# Patient Record
Sex: FEMALE | Race: WHITE | Employment: FULL TIME | URBAN - METROPOLITAN AREA
[De-identification: names, ages, dates, MRNs, and addresses within clinical notes are randomized per-mention and may not be internally consistent; named-entity substitution may affect disease eponyms.]

---

## 2024-04-03 ENCOUNTER — HOSPITAL ENCOUNTER (OUTPATIENT)
Dept: PHYSICAL THERAPY | Age: 54
Setting detail: RECURRING SERIES
End: 2024-04-03
Attending: STUDENT IN AN ORGANIZED HEALTH CARE EDUCATION/TRAINING PROGRAM
Payer: COMMERCIAL

## 2024-04-03 NOTE — PROGRESS NOTES
Sandra Wu  : 1970  Primary: [facility]  Secondary:  [facility] @ [address]  [facility] [address]  Phone: [phone]  Fax: [phone]    PT Visit Info:    Total # of Visits to Date: 1  Progress Note Counter: 1  Progress Note Due Date: 24  Canceled Appointment: 1     OT Visit Info:  No data recorded    OUTPATIENT THERAPY: 4/3/2024  Episode  Appt Desk        Sandra Wu cancelled her appointment for today due to   in the hospital .  Will plan to follow up next during next appointment.  Thank you,  DEVORAH KNIGHT PTA    Future Appointments   Date Time Provider Department Center   4/3/2024  7:00 PM Devorah Knight, PTA [facility]   2024 10:15 AM Vadim Che, PT [facility]   4/10/2024 10:15 AM Vadim Che, PT [facility]   4/15/2024 10:15 AM Vadim Che, PT [facility]   2024 10:15 AM Vadim Che, PT [facility]   2024 10:15 AM Laura Pham MD [facility] AMB   2024 10:15 AM Vadim Che, PT [facility]   2024 10:15 AM Vadim Che, PT [facility]   2024 10:15 AM Devorah Knight, PTA [facility] S[facility]   2024 10:15 AM Devorah Knight, PTA [facility] S[facility]   2024  9:10 AM Candis Burns MD [facility] G[facility] AMB   2024  8:30 AM Lora Horowitz, PAJuan ManuelC PO GVL AMB

## 2024-04-08 ENCOUNTER — HOSPITAL ENCOUNTER (OUTPATIENT)
Dept: PHYSICAL THERAPY | Age: 54
Setting detail: RECURRING SERIES
End: 2024-04-08
Attending: STUDENT IN AN ORGANIZED HEALTH CARE EDUCATION/TRAINING PROGRAM
Payer: COMMERCIAL

## 2024-04-08 NOTE — PROGRESS NOTES
Sandra Wu  : 1970  Primary: [facility]  Secondary:  [facility] @ [address]  [facility] [address]  Phone: [phone]  Fax: [phone]    PT Visit Info:    Total # of Visits to Date: 1  Progress Note Counter: 1  Progress Note Due Date: 24  Canceled Appointment: 1     OT Visit Info:  No data recorded    OUTPATIENT THERAPY: 2024  Episode  Appt Desk        Sandra Wu cancelled her appointment for today due to work related issues.  Will plan to follow up next during next appointment.  Thank you,  Vadim Che, PT    Future Appointments   Date Time Provider Department Center   4/10/2024 10:15 AM Vadim Che, PT [facility]   4/15/2024 10:15 AM Vadim Che, PT [facility]   2024 10:15 AM Vadim Che, PT [facility]   2024 10:15 AM Laura Pham MD [facility] G[facility] AMB   2024 10:15 AM Vadim Che, PT [facility] S[facility]   2024 10:15 AM Vadim Che, PT [facility] S[facility]   2024 10:15 AM Devorah Cabral, PTA [facility] S[facility]   2024 10:15 AM Devorah Cabral, PTA [facility] SFE   2024  9:10 AM Candis Burns MD [facility] GVL AMB   2024  8:30 AM Lora Horowitz, PAJuan ManuelC PO GVL AMB

## 2024-04-22 ENCOUNTER — HOSPITAL ENCOUNTER (OUTPATIENT)
Dept: PHYSICAL THERAPY | Age: 54
Setting detail: RECURRING SERIES
End: 2024-04-22
Attending: STUDENT IN AN ORGANIZED HEALTH CARE EDUCATION/TRAINING PROGRAM
Payer: COMMERCIAL

## 2024-04-22 NOTE — PROGRESS NOTES
Sandra Wu  : 1970  Primary: [facility]  Secondary:  [facility] @ [address]  [facility] [address]  Phone: [phone]  Fax: [phone]    PT Visit Info:    Total # of Visits to Date: 3  Progress Note Counter: 3  Progress Note Due Date: 24  Canceled Appointment: 1     OT Visit Info:  No data recorded    OUTPATIENT THERAPY: 2024  Episode  Appt Desk        Sandra Wu cancelled her appointment for today due to   has an appt .  Will plan to follow up next during next appointment.  Thank you,  Vadim Che, PT    Future Appointments   Date Time Provider Department Center   2024  7:00 PM Vadim Che, PT [facility]   2024 10:15 AM Vadim Che, PT [facility] S[facility]   2024 10:15 AM Devorah Cabral, PTA [facility]PT SFE   2024 10:15 AM Devorah Cabral, PTA S[facility]ORPT SFE   2024 11:00 AM Laura Pham MD [facility] GVL AMB   2024  9:10 AM Candis Burns MD [facility] GVL AMB   2024  9:20 AM Lora Horowitz, PAJuan ManuelC [facility] GVL AMB

## 2024-04-29 ENCOUNTER — HOSPITAL ENCOUNTER (OUTPATIENT)
Dept: PHYSICAL THERAPY | Age: 54
Setting detail: RECURRING SERIES
Discharge: HOME | End: 2024-05-02
Attending: STUDENT IN AN ORGANIZED HEALTH CARE EDUCATION/TRAINING PROGRAM
Payer: COMMERCIAL

## 2024-04-29 PROCEDURE — 97110 THERAPEUTIC EXERCISES: CPT

## 2024-04-29 PROCEDURE — 97140 MANUAL THERAPY 1/> REGIONS: CPT

## 2024-04-29 ASSESSMENT — PAIN SCALES - GENERAL: PAINLEVEL_OUTOF10: 8

## 2024-04-29 NOTE — PROGRESS NOTES
Sandra Wu  : 1970  Primary: [facility] (Med[facility] Managed)  Secondary:  [facility] @ [address]  [facility] [address]  Phone: [phone]  Fax: [phone] Plan Frequency: 2x/wk for 90 days    Plan of Care/Certification Expiration Date: 24        Plan of Care/Certification Expiration Date:  Plan of Care/Certification Expiration Date: 24    Frequency/Duration: Plan Frequency: 2x/wk for 90 days      Time In/Out:   Time In: 1025  Time Out: 1105      PT Visit Info:    Progress Note Due Date: 24  Total # of Visits to Date: 4  Progress Note Counter: 4  Canceled Appointment: 1      Visit Count:  4    OUTPATIENT PHYSICAL THERAPY:   Treatment Note 2024       Episode  (PT: LBP, R Hip Pain, Bilateral Knee Pain)               Treatment Diagnosis:    Other low back pain  Pain in right hip  Right knee pain, unspecified chronicity  Left knee pain, unspecified chronicity  Medical/Referring Diagnosis:    Pain in both knees, unspecified chronicity  Chronic right-sided low back pain with right-sided sciatica  Right hip pain  Referring Physician:  Laura Pham MD MD Orders:  PT Eval and Treat   Return MD Appt:  24   Date of Onset:  LBP approximately 3 years duration; B knee pain approximately 2 months duration   Allergies:   Amoxicillin, Ibuprofen-diphenhydramine cit, and Sulfa antibiotics  Restrictions/Precautions:   None      Interventions Planned (Treatment may consist of any combination of the following):     See Assessment Note    Subjective Comments:   \"It is going down my leg, starting at my tailbone - this is new, none of the medication is touching it\"  \" was pulling weeds, but I sit down and do it\"  \"My neck is stiff too\" \"I have follow up with my doctors  and \"  \"I really don't do my exercises at home, I have a lot going on\"  Initial Pain Level::     8/10 back/hip going down right knee  Post Session Pain Level:

## 2024-05-01 ENCOUNTER — HOSPITAL ENCOUNTER (OUTPATIENT)
Dept: PHYSICAL THERAPY | Age: 54
Setting detail: RECURRING SERIES
End: 2024-05-01
Attending: STUDENT IN AN ORGANIZED HEALTH CARE EDUCATION/TRAINING PROGRAM
Payer: COMMERCIAL

## 2024-05-01 NOTE — PROGRESS NOTES
Name: Sandra Lopez  YOB: 1970  Gender: female  MRN: [id number]  Date of Encounter:  5/2/2024       CHIEF COMPLAINT:     Chief Complaint   Patient presents with    Injections     Bilateral Knee (Euflexxa #1)        SUBJECTIVE/OBJECTIVE:      HPI:    Patient is a 53 y.o. pleasant female who presents today for a Euflexxa injection of the bilateral knee, #1    Recall Hx:  Ms. Wu comes in for pain in both knees but it is more severe on the right. She has right hip pain radiating down her leg to her ankle. Pain in the hip is both posterior and lateral. Knee pain is more lateral. She has swelling of the knee and grinding. Pain is throbbing and burning. She denies numbness. She has been taking mobic and robaxin. She has a hx of right PTTD with subtalar arthritis and was previously in a boot and did physical therapy for the foot and ankle in '22.      3/7/24: Recommended bilateral CSI, which she elected to proceed with. Also advised formal therapy and an evaluation with spine team.  4/18/24: She had acute pain following injection, then some relief that day, but no lasting pain relief with CSI. She has gone to 2 therapy visits and notes soreness, but she also feels she is weak with exercises and seems encouraged that this will help. She has seen Lora Horowitz about her back, who also recommended PT.     1. Primary osteoarthritis of left knee    2. Primary osteoarthritis of right knee        Bilateral Knee Injection - US guided      An injection of Euflexxa viscosupplement was discussed today and is indicated for patient’s pain and condition today. Risks including infection, bleeding, nerve damage, and pain at the site of injection were discussed at length with the patient. Benefits including pain relief were also discussed with the patient. Patient verbalizes understanding of these and agrees to procedure. she consents to this procedure.Time-out was conducted with all members of the care team.     The

## 2024-05-01 NOTE — PROGRESS NOTES
Sandra Wu  : 1970  Primary: [facility]  Secondary:  [facility] @ [address]  [facility] [address]  Phone: [phone]  Fax: [phone]    PT Visit Info:    Total # of Visits to Date: 4  Progress Note Counter: 4  Progress Note Due Date: 24  No Show: 1  Canceled Appointment: 4     OT Visit Info:  No data recorded    OUTPATIENT THERAPY: 2024  Episode  Appt Desk        Sandra Wu cancelled her  4th cancellation at this time with 1 NO SHOW  for today due to  taking  back home for hospital .  Will plan to follow up next during next appointment.  Thank you,  KELECHI KNIGHT [facility]    Future Appointments   Date Time Provider Department Center   2024 11:00 AM Laura Pham MD [facility] GVL AMB   2024  9:10 AM Candis Burns MD [facility] GVL AMB   2024  9:20 AM Lora Horowitz PAJuan ManuelC [facility] GVL AMB   2024  1:00 PM Mikal Fuentes MD PM GVL AMB

## 2024-05-02 ENCOUNTER — OFFICE VISIT (OUTPATIENT)
Dept: ORTHOPEDIC SURGERY | Age: 54
End: 2024-05-02

## 2024-05-02 DIAGNOSIS — M17.12 PRIMARY OSTEOARTHRITIS OF LEFT KNEE: Primary | ICD-10-CM

## 2024-05-02 DIAGNOSIS — M17.11 PRIMARY OSTEOARTHRITIS OF RIGHT KNEE: ICD-10-CM

## 2024-05-02 RX ORDER — HYALURONATE SODIUM 10 MG/ML
20 SYRINGE (ML) INTRAARTICULAR ONCE
Status: COMPLETED | OUTPATIENT
Start: 2024-05-02 | End: 2024-05-02

## 2024-05-02 RX ADMIN — Medication 20 MG: at 11:14

## 2024-05-02 RX ADMIN — Medication 20 MG: at 11:15

## 2024-05-03 RX ORDER — HYDROCHLOROTHIAZIDE 12.5 MG/1
12.5 TABLET ORAL DAILY
COMMUNITY
Start: 2024-04-06 | End: 2024-05-06

## 2024-05-03 SDOH — HEALTH STABILITY: PHYSICAL HEALTH: ON AVERAGE, HOW MANY MINUTES DO YOU ENGAGE IN EXERCISE AT THIS LEVEL?: 120 MIN

## 2024-05-03 SDOH — HEALTH STABILITY: PHYSICAL HEALTH: ON AVERAGE, HOW MANY DAYS PER WEEK DO YOU ENGAGE IN MODERATE TO STRENUOUS EXERCISE (LIKE A BRISK WALK)?: 5 DAYS

## 2024-05-06 ENCOUNTER — HOSPITAL ENCOUNTER (OUTPATIENT)
Dept: PHYSICAL THERAPY | Age: 54
Setting detail: RECURRING SERIES
Discharge: HOME | End: 2024-05-09
Attending: STUDENT IN AN ORGANIZED HEALTH CARE EDUCATION/TRAINING PROGRAM
Payer: COMMERCIAL

## 2024-05-06 ENCOUNTER — OFFICE VISIT (OUTPATIENT)
Dept: PRIMARY CARE CLINIC | Facility: CLINIC | Age: 54
End: 2024-05-06
Payer: COMMERCIAL

## 2024-05-06 VITALS
HEIGHT: 65 IN | OXYGEN SATURATION: 97 % | HEART RATE: 85 BPM | SYSTOLIC BLOOD PRESSURE: 122 MMHG | DIASTOLIC BLOOD PRESSURE: 82 MMHG | WEIGHT: 228.6 LBS | BODY MASS INDEX: 38.09 KG/M2

## 2024-05-06 DIAGNOSIS — Z13.1 SCREENING FOR DIABETES MELLITUS: ICD-10-CM

## 2024-05-06 DIAGNOSIS — K76.0 STEATOSIS OF LIVER: ICD-10-CM

## 2024-05-06 DIAGNOSIS — E78.00 HIGH CHOLESTEROL: ICD-10-CM

## 2024-05-06 DIAGNOSIS — K21.9 GASTROESOPHAGEAL REFLUX DISEASE WITHOUT ESOPHAGITIS: ICD-10-CM

## 2024-05-06 DIAGNOSIS — D50.9 IRON DEFICIENCY ANEMIA, UNSPECIFIED IRON DEFICIENCY ANEMIA TYPE: ICD-10-CM

## 2024-05-06 DIAGNOSIS — E66.9 OBESITY, UNSPECIFIED CLASSIFICATION, UNSPECIFIED OBESITY TYPE, UNSPECIFIED WHETHER SERIOUS COMORBIDITY PRESENT: ICD-10-CM

## 2024-05-06 DIAGNOSIS — I10 HYPERTENSION, UNSPECIFIED TYPE: ICD-10-CM

## 2024-05-06 DIAGNOSIS — I10 HYPERTENSION, UNSPECIFIED TYPE: Primary | ICD-10-CM

## 2024-05-06 LAB
ALBUMIN SERPL-MCNC: 3.8 G/DL (ref 3.5–5)
ALBUMIN/GLOB SERPL: 1.3 (ref 1–1.9)
ALP SERPL-CCNC: 106 U/L (ref 35–104)
ALT SERPL-CCNC: 15 U/L (ref 12–65)
ANION GAP SERPL CALC-SCNC: 11 MMOL/L (ref 9–18)
AST SERPL-CCNC: 17 U/L (ref 15–37)
BASOPHILS # BLD: 0 K/UL (ref 0–0.2)
BASOPHILS NFR BLD: 1 % (ref 0–2)
BILIRUB SERPL-MCNC: 0.2 MG/DL (ref 0–1.2)
BUN SERPL-MCNC: 18 MG/DL (ref 6–23)
CALCIUM SERPL-MCNC: 9.5 MG/DL (ref 8.8–10.2)
CHLORIDE SERPL-SCNC: 103 MMOL/L (ref 98–107)
CHOLEST SERPL-MCNC: 186 MG/DL (ref 0–200)
CO2 SERPL-SCNC: 28 MMOL/L (ref 20–28)
CREAT SERPL-MCNC: 1.16 MG/DL (ref 0.6–1.1)
DIFFERENTIAL METHOD BLD: ABNORMAL
EOSINOPHIL # BLD: 0.2 K/UL (ref 0–0.8)
EOSINOPHIL NFR BLD: 3 % (ref 0.5–7.8)
ERYTHROCYTE [DISTWIDTH] IN BLOOD BY AUTOMATED COUNT: 15.3 % (ref 11.9–14.6)
EST. AVERAGE GLUCOSE BLD GHB EST-MCNC: 125 MG/DL
GLOBULIN SER CALC-MCNC: 2.9 G/DL (ref 2.3–3.5)
GLUCOSE SERPL-MCNC: 108 MG/DL (ref 70–99)
HBA1C MFR BLD: 6 % (ref 0–5.6)
HCT VFR BLD AUTO: 38.9 % (ref 35.8–46.3)
HDLC SERPL-MCNC: 61 MG/DL (ref 40–60)
HDLC SERPL: 3.1 (ref 0–5)
HGB BLD-MCNC: 12 G/DL (ref 11.7–15.4)
IMM GRANULOCYTES # BLD AUTO: 0 K/UL (ref 0–0.5)
IMM GRANULOCYTES NFR BLD AUTO: 0 % (ref 0–5)
IRON SERPL-MCNC: 32 UG/DL (ref 35–100)
LDLC SERPL CALC-MCNC: 100 MG/DL (ref 0–100)
LYMPHOCYTES # BLD: 1.5 K/UL (ref 0.5–4.6)
LYMPHOCYTES NFR BLD: 25 % (ref 13–44)
MCH RBC QN AUTO: 27.8 PG (ref 26.1–32.9)
MCHC RBC AUTO-ENTMCNC: 30.8 G/DL (ref 31.4–35)
MCV RBC AUTO: 90.3 FL (ref 82–102)
MONOCYTES # BLD: 0.4 K/UL (ref 0.1–1.3)
MONOCYTES NFR BLD: 7 % (ref 4–12)
NEUTS SEG # BLD: 3.9 K/UL (ref 1.7–8.2)
NEUTS SEG NFR BLD: 64 % (ref 43–78)
NRBC # BLD: 0 K/UL (ref 0–0.2)
PLATELET # BLD AUTO: 323 K/UL (ref 150–450)
PMV BLD AUTO: 9.6 FL (ref 9.4–12.3)
POTASSIUM SERPL-SCNC: 4.1 MMOL/L (ref 3.5–5.1)
PROT SERPL-MCNC: 6.6 G/DL (ref 6.3–8.2)
RBC # BLD AUTO: 4.31 M/UL (ref 4.05–5.2)
SODIUM SERPL-SCNC: 141 MMOL/L (ref 136–145)
TRIGL SERPL-MCNC: 125 MG/DL (ref 0–150)
TSH, 3RD GENERATION: 3.11 UIU/ML (ref 0.27–4.2)
VLDLC SERPL CALC-MCNC: 25 MG/DL (ref 6–23)
WBC # BLD AUTO: 6.1 K/UL (ref 4.3–11.1)

## 2024-05-06 PROCEDURE — 3079F DIAST BP 80-89 MM HG: CPT | Performed by: INTERNAL MEDICINE

## 2024-05-06 PROCEDURE — G0283 ELEC STIM OTHER THAN WOUND: HCPCS

## 2024-05-06 PROCEDURE — 3074F SYST BP LT 130 MM HG: CPT | Performed by: INTERNAL MEDICINE

## 2024-05-06 PROCEDURE — 97110 THERAPEUTIC EXERCISES: CPT

## 2024-05-06 PROCEDURE — 99204 OFFICE O/P NEW MOD 45 MIN: CPT | Performed by: INTERNAL MEDICINE

## 2024-05-06 ASSESSMENT — PATIENT HEALTH QUESTIONNAIRE - PHQ9
SUM OF ALL RESPONSES TO PHQ QUESTIONS 1-9: 0
1. LITTLE INTEREST OR PLEASURE IN DOING THINGS: NOT AT ALL
SUM OF ALL RESPONSES TO PHQ QUESTIONS 1-9: 0
2. FEELING DOWN, DEPRESSED OR HOPELESS: NOT AT ALL
SUM OF ALL RESPONSES TO PHQ QUESTIONS 1-9: 0
SUM OF ALL RESPONSES TO PHQ9 QUESTIONS 1 & 2: 0
SUM OF ALL RESPONSES TO PHQ QUESTIONS 1-9: 0

## 2024-05-06 ASSESSMENT — ENCOUNTER SYMPTOMS: RESPIRATORY NEGATIVE: 1

## 2024-05-06 NOTE — PROGRESS NOTES
FOLLOW UP VISIT    Subjective:    Sandra Lopez (: 1970) is a 54 y.o., female,   Chief Complaint   Patient presents with    [facility]       HPI:  HPI    54 year old in to est care  High blood pressure  High cholesterol   Anxiety and depression has Psych   ADHD - on meds  GERD on PPI   Vit d def- on vit d  Fatty liver  Allergies on meds  Back and knee pain goes to Ortho on gabapentin   HCM s/p CAROLINE BSO   Mammogram  Colonoscopy age 50 due 55   The following portions of the patient's history were reviewed and updated as appropriate:      Past Medical History:   Diagnosis Date    Hypertension     Psychiatric disorder     drpression/anxiety       History reviewed. No pertinent surgical history.    History reviewed. No pertinent family history.    Social History     Socioeconomic History    Marital status:      Spouse name: Not on file    Number of children: Not on file    Years of education: Not on file    Highest education level: Not on file   Occupational History    Not on file   Tobacco Use    Smoking status: Former     Current packs/day: 0.00     Types: Cigarettes     Quit date: 2023     Years since quittin.4    Smokeless tobacco: Not on file   Substance and Sexual Activity    Alcohol use: Yes    Drug use: No    Sexual activity: Not on file   Other Topics Concern    Not on file   Social History Narrative    Not on file     Social Determinants of Health     Financial Resource Strain: Not on file   Food Insecurity: Not on file   Transportation Needs: Not on file   Physical Activity: Sufficiently Active (5/3/2024)    Exercise Vital Sign     Days of Exercise per Week: 5 days     Minutes of Exercise per Session: 120 min   Stress: Not on file   Social Connections: Not on file   Intimate Partner Violence: Not on file   Housing Stability: Not on file       Current Outpatient Medications   Medication Sig Dispense Refill    ADDERALL, 10MG, 10 MG tablet       lisinopril-hydroCHLOROthiazide

## 2024-05-06 NOTE — PROGRESS NOTES
Sandra Lopez  : 1970  Primary: [facility] (Med[facility] Managed)  Secondary:  [facility] @ [address]  [facility] [address]  Phone: [phone]  Fax: [phone] Plan Frequency: 2x/wk for 90 days    Plan of Care/Certification Expiration Date: 24        Plan of Care/Certification Expiration Date:  Plan of Care/Certification Expiration Date: 24    Frequency/Duration: Plan Frequency: 2x/wk for 90 days      Time In/Out:   Time In: 1430  Time Out: 1515      PT Visit Info:    Progress Note Due Date: 24  Total # of Visits to Date: 5  Progress Note Counter: 5  No Show: 1  Canceled Appointment: 4      Visit Count:  4    OUTPATIENT PHYSICAL THERAPY:   Treatment Note 2024       Episode  (PT: LBP, R Hip Pain, Bilateral Knee Pain)               Treatment Diagnosis:    Other low back pain  Pain in right hip  Right knee pain, unspecified chronicity  Left knee pain, unspecified chronicity  Medical/Referring Diagnosis:    Pain in both knees, unspecified chronicity  Chronic right-sided low back pain with right-sided sciatica  Right hip pain  Referring Physician:  Laura Pham MD MD Orders:  PT Eval and Treat   Return MD Appt:  24   Date of Onset:  LBP approximately 3 years duration; B knee pain approximately 2 months duration   Allergies:   Amoxicillin and Sulfa antibiotics  Restrictions/Precautions:   None      Interventions Planned (Treatment may consist of any combination of the following):     See Assessment Note    Subjective Comments:   \"I got the jell injections in my knees and it has helped a lot\"  Initial Pain Level::   7   /10 SI joint/low back area  Post Session Pain Level:     6   /10  Medications Last Reviewed:  2024  Updated Objective Findings:  None Today  Treatment   THERAPEUTIC EXERCISE: (25 minutes):    Exercises per grid below to improve mobility and strength.  Required minimal verbal cues to promote proper body

## 2024-05-07 NOTE — PROGRESS NOTES
procedure.Time-out was conducted with all members of the care team.     The patient was placed in a supine position with the bilateral slightly flexed to 30 degrees. A superior lateral approach was utilized for this injection procedure. The ultrasound probe was use to localize the joint capsule. The site of the injection was then cleansed chlorhexidine. A sterile gel was then placed over the area and the probe was repositioned to reveal the intraarticular space. Following this a vapo coolant spray was utilized to provide local skin anesthesia. A  Euflexxa injection was delivered through a 22 gauge, 1.5\" needle into the joint capsule. The site was again cleansed with an alcohol swab. The patient tolerated this procedure well with no adverse events. The patient was counseled not to submerge the site for 24 hours, not to perform strenuous activity for the next five days, and if notes any signs or symptoms consistent with joint infection or allergic reaction to go to a local emergency room. The patient was observed for 15 minutes postprocedure and was allowed to be discharged from clinic in their usual state of health.  Ultrasound use was deemed to be medically necessary to ensure appropriate placement of the injectate. Images were saved to PACS system.      Orders Placed This Encounter    US ARTHR/ASP/INJ MAJOR JNT/BURSA RIGHT     Standing Status:   Future     Number of Occurrences:   1     Standing Expiration Date:   5/9/2025    US ARTHR/ASP/INJ MAJOR JNT/BURSA LEFT     Standing Status:   Future     Number of Occurrences:   1     Standing Expiration Date:   5/9/2025    sodium hyaluronate (EUFLEXXA, HYALGAN) injection 20 mg     Order Specific Question:   Which brand are you ordering?     Answer:   Euflexxa    sodium hyaluronate (EUFLEXXA, HYALGAN) injection 20 mg     Order Specific Question:   Which brand are you ordering?     Answer:   Euflexxa        Return in about 1 week (around 5/16/2024).     Laura Pham

## 2024-05-08 ENCOUNTER — OFFICE VISIT (OUTPATIENT)
Age: 54
End: 2024-05-08
Payer: COMMERCIAL

## 2024-05-08 DIAGNOSIS — M43.16 SPONDYLOLISTHESIS OF LUMBAR REGION: Primary | ICD-10-CM

## 2024-05-08 DIAGNOSIS — M47.816 FACET ARTHROPATHY, LUMBAR: ICD-10-CM

## 2024-05-08 DIAGNOSIS — M54.16 LUMBAR RADICULOPATHY: ICD-10-CM

## 2024-05-08 PROCEDURE — 99213 OFFICE O/P EST LOW 20 MIN: CPT | Performed by: PHYSICIAN ASSISTANT

## 2024-05-08 NOTE — PROGRESS NOTES
Name: Sandra Lopez  YOB: 1970  Gender: female  MRN: [id number]    CC: Back Pain (Follow up after PT)       HPI: This is a 54 y.o. year old female who reports 2-year history of lower back pain mostly on the right side of the lower back radiating into the right lateral leg with numbness and tingling and cramping curling sensation in mostly her right foot and toes.  She does have DJD bilateral knees she is seeing Dr. Laura Pham for that.  She cramps mostly in her right leg.  She has been on meloxicam, methocarbamol without significant relief.  Pain is present at all times.  X-rays of the lumbar spine revealed spondylolisthesis L4-5 5 facet arthropathy.  We referred her for physical therapy.  She has been participating in physical therapy however now that her knees have had some improvement with the viscosupplementation, her back pain is worse.  She is feeling pain radiating into bilateral legs.  Her toes and feet are cramping.  She has had 13 visits of physical therapy since March 2024.         3/27/2024     8:51 AM   AMB PAIN ASSESSMENT   Location of Pain Back    Location Modifiers Medial;Right   Severity of Pain 8   Quality of Pain Aching   Duration of Pain Persistent   Frequency of Pain Intermittent   Date Pain First Started 2/9/2022   Aggravating Factors Walking;Standing   Limiting Behavior Some   Relieving Factors Other (Comment)    Result of Injury No   Work-Related Injury No   Are there other pain locations you wish to document? No       Significant value              ROS/Meds/PSH/PMH/FH/SH: I personally reviewed the patient's collected intake data.  Below are the pertinents:    Allergies   Allergen Reactions    Amoxicillin Rash     Other Reaction(s): Rash-Allergy    Sulfa Antibiotics Nausea And Vomiting         Current Outpatient Medications:     albuterol sulfate HFA (PROVENTIL;VENTOLIN;PROAIR) 108 (90 Base) MCG/ACT inhaler, Inhale 2 puffs into the lungs every 6 hours as needed, Disp: , Rfl:

## 2024-05-09 ENCOUNTER — OFFICE VISIT (OUTPATIENT)
Dept: ORTHOPEDIC SURGERY | Age: 54
End: 2024-05-09
Payer: COMMERCIAL

## 2024-05-09 ENCOUNTER — HOSPITAL ENCOUNTER (OUTPATIENT)
Dept: PHYSICAL THERAPY | Age: 54
Setting detail: RECURRING SERIES
Discharge: HOME | End: 2024-05-12
Attending: STUDENT IN AN ORGANIZED HEALTH CARE EDUCATION/TRAINING PROGRAM
Payer: COMMERCIAL

## 2024-05-09 DIAGNOSIS — M17.12 PRIMARY OSTEOARTHRITIS OF LEFT KNEE: Primary | ICD-10-CM

## 2024-05-09 DIAGNOSIS — M17.11 PRIMARY OSTEOARTHRITIS OF RIGHT KNEE: ICD-10-CM

## 2024-05-09 PROCEDURE — 20611 DRAIN/INJ JOINT/BURSA W/US: CPT | Performed by: STUDENT IN AN ORGANIZED HEALTH CARE EDUCATION/TRAINING PROGRAM

## 2024-05-09 PROCEDURE — G0283 ELEC STIM OTHER THAN WOUND: HCPCS

## 2024-05-09 PROCEDURE — 97110 THERAPEUTIC EXERCISES: CPT

## 2024-05-09 RX ORDER — HYALURONATE SODIUM 10 MG/ML
20 SYRINGE (ML) INTRAARTICULAR ONCE
Status: COMPLETED | OUTPATIENT
Start: 2024-05-09 | End: 2024-05-09

## 2024-05-09 RX ADMIN — Medication 20 MG: at 11:28

## 2024-05-09 RX ADMIN — Medication 20 MG: at 11:29

## 2024-05-09 NOTE — PROGRESS NOTES
Sandra Lopez  : 1970  Primary: [facility] ([facility] Man[facility])  Secondary:  [facility] @ [address]  [facility] [address]  Phone: [phone]  Fax: [phone] Plan Frequency: 2x/wk for 90 days    Plan of Care/Certification Expiration Date: 24        Plan of Care/Certification Expiration Date:  Plan of Care/Certification Expiration Date: 24    Frequency/Duration: Plan Frequency: 2x/wk for 90 days      Time In/Out:   Time In: 0820  Time Out: 0900      PT Visit Info:    Progress Note Due Date: 24  Total # of Visits to Date: 6  Progress Note Counter: 1  No Show: 1  Canceled Appointment: 4      Visit Count:  6    OUTPATIENT PHYSICAL THERAPY:   Treatment Note 2024       Episode  (PT: LBP, R Hip Pain, Bilateral Knee Pain)               Treatment Diagnosis:    Other low back pain  Pain in right hip  Right knee pain, unspecified chronicity  Left knee pain, unspecified chronicity  Medical/Referring Diagnosis:    Pain in both knees, unspecified chronicity  Chronic right-sided low back pain with right-sided sciatica  Right hip pain  Referring Physician:  Laura Pham MD MD Orders:  PT Eval and Treat   Return MD Appt:  24   Date of Onset:  LBP approximately 3 years duration; B knee pain approximately 2 months duration   Allergies:   Amoxicillin and Sulfa antibiotics  Restrictions/Precautions:   None      Interventions Planned (Treatment may consist of any combination of the following):     See Assessment Note    Subjective Comments:   Pt states she has been trying to do her exercises once a day.  Pt states she had her first knee injections andthey were helpful.  She states she gets her second injections today.  Initial Pain Level::   0   /10 knees, 5/10 in low back  Post Session Pain Level:     0   /10 knees, 5/10 low back  Medications Last Reviewed:  2024  Updated Objective Findings:  None Today  Treatment   THERAPEUTIC

## 2024-05-09 NOTE — PROGRESS NOTES
Carlito       Sandra John  : 1970  Primary: [facility] (Medicaid Managed)  Secondary:  [facility] @ [address]  [facility] [address]  Phone: [phone]  Fax: [phone] Plan Frequency: 2x/wk for 90 days    Plan of Care/Certification Expiration Date: 24        Plan of Care/Certification Expiration Date:  Plan of Care/Certification Expiration Date: 24    Frequency/Duration: Plan Frequency: 2x/wk for 90 days      Time In/Out:   Time In: 0820  Time Out: 0900      PT Visit Info:    Progress Note Due Date: 24  Total # of Visits to Date: 6  Progress Note Counter: 1  No Show: 1  Canceled Appointment: 4      Visit Count:  6                OUTPATIENT PHYSICAL THERAPY:             Progress Report 2024               Episode (PT: LBP, R Hip Pain, Bilateral Knee Pain)         Treatment Diagnosis:     Other low back pain  Pain in right hip  Right knee pain, unspecified chronicity  Left knee pain, unspecified chronicity  Medical/Referring Diagnosis:    Pain in both knees, unspecified chronicity  Chronic right-sided low back pain with right-sided sciatica  Right hip pain  Referring Physician:  Laura Pham MD MD Orders:  PT Eval and Treat   Return MD Appt:  24  Date of Onset:    LBP approximately 3 years duration; B knee pain approximately 2 months duration  Allergies:  Amoxicillin and Sulfa antibiotics  Restrictions/Precautions:    None      Medications Last Reviewed:  2024     SUBJECTIVE   History of Injury/Illness (Reason for Referral):  Pt reports insidious onset bilateral low back pain R>L of approximately 3 years duration.  She states she had a hysterectomy in 2019 and she lost a significant amount of weight.  She states she stopped smoking and has gained all of the weight back.  She states her pain has increased tremendously the past month and she feels this is due to her weight gain.  Aggravating factors include sleeping,

## 2024-05-13 ENCOUNTER — TELEMEDICINE (OUTPATIENT)
Dept: PRIMARY CARE CLINIC | Facility: CLINIC | Age: 54
End: 2024-05-13
Payer: COMMERCIAL

## 2024-05-13 ENCOUNTER — HOSPITAL ENCOUNTER (OUTPATIENT)
Dept: PHYSICAL THERAPY | Age: 54
Setting detail: RECURRING SERIES
End: 2024-05-13
Attending: STUDENT IN AN ORGANIZED HEALTH CARE EDUCATION/TRAINING PROGRAM
Payer: COMMERCIAL

## 2024-05-13 DIAGNOSIS — K21.9 GASTROESOPHAGEAL REFLUX DISEASE WITHOUT ESOPHAGITIS: ICD-10-CM

## 2024-05-13 DIAGNOSIS — I10 HYPERTENSION, UNSPECIFIED TYPE: Primary | ICD-10-CM

## 2024-05-13 DIAGNOSIS — E78.00 HIGH CHOLESTEROL: ICD-10-CM

## 2024-05-13 DIAGNOSIS — K76.0 STEATOSIS OF LIVER: ICD-10-CM

## 2024-05-13 DIAGNOSIS — D50.9 IRON DEFICIENCY ANEMIA, UNSPECIFIED IRON DEFICIENCY ANEMIA TYPE: ICD-10-CM

## 2024-05-13 DIAGNOSIS — R73.9 HIGH BLOOD SUGAR: ICD-10-CM

## 2024-05-13 PROCEDURE — 99214 OFFICE O/P EST MOD 30 MIN: CPT | Performed by: INTERNAL MEDICINE

## 2024-05-13 RX ORDER — LISINOPRIL AND HYDROCHLOROTHIAZIDE 12.5; 1 MG/1; MG/1
TABLET ORAL
Qty: 90 TABLET | Refills: 1 | Status: SHIPPED | OUTPATIENT
Start: 2024-05-13

## 2024-05-13 RX ORDER — BACILLUS COAGULANS 1B CELL
CAPSULE ORAL
Qty: 90 TABLET | Refills: 1 | Status: SHIPPED | OUTPATIENT
Start: 2024-05-13

## 2024-05-13 SDOH — ECONOMIC STABILITY: HOUSING INSECURITY
IN THE LAST 12 MONTHS, WAS THERE A TIME WHEN YOU DID NOT HAVE A STEADY PLACE TO SLEEP OR SLEPT IN A SHELTER (INCLUDING NOW)?: NO

## 2024-05-13 SDOH — ECONOMIC STABILITY: FOOD INSECURITY: WITHIN THE PAST 12 MONTHS, YOU WORRIED THAT YOUR FOOD WOULD RUN OUT BEFORE YOU GOT MONEY TO BUY MORE.: NEVER TRUE

## 2024-05-13 SDOH — ECONOMIC STABILITY: INCOME INSECURITY: HOW HARD IS IT FOR YOU TO PAY FOR THE VERY BASICS LIKE FOOD, HOUSING, MEDICAL CARE, AND HEATING?: SOMEWHAT HARD

## 2024-05-13 SDOH — ECONOMIC STABILITY: FOOD INSECURITY: WITHIN THE PAST 12 MONTHS, THE FOOD YOU BOUGHT JUST DIDN'T LAST AND YOU DIDN'T HAVE MONEY TO GET MORE.: OFTEN TRUE

## 2024-05-13 SDOH — ECONOMIC STABILITY: TRANSPORTATION INSECURITY
IN THE PAST 12 MONTHS, HAS LACK OF TRANSPORTATION KEPT YOU FROM MEETINGS, WORK, OR FROM GETTING THINGS NEEDED FOR DAILY LIVING?: NO

## 2024-05-13 ASSESSMENT — ENCOUNTER SYMPTOMS: RESPIRATORY NEGATIVE: 1

## 2024-05-13 NOTE — PROGRESS NOTES
Sandra Lopez  : 1970  Primary: [facility]  Secondary:  [facility] @ [address]  [place] [address]  Phone: [phone]  Fax: [phone]    PT Visit Info:    Total # of Visits to Date: 6  Progress Note Counter: 1  Progress Note Due Date: 24  No Show: 2  Canceled Appointment: 4     OT Visit Info:  No data recorded    OUTPATIENT THERAPY: 2024  Episode  Appt Desk        Sandra Lopez did not show for her appointment for today due to unknown reasons.  Patient came in over 25 minutes late again for her appointment, not seen today and rescheduled at this time.  Patient has missed many appointments, 2 NO SHOWS, 4 cancellations at this time.  Will plan to follow up next during next appointment.  Thank you,  DEVORAH KNIGHT PTA    Future Appointments   Date Time Provider Department [place]   2024 12:50 PM Candis Burns MD [facility] GVL AMB   2024  8:00 AM Vadim Che, PT SFEORPT SFE   2024  3:30 PM Laura Pham MD POAI GVL AMB   2024  8:00 AM Devorah Knight PTA SFEORPT SFE   2024  8:00 AM Devorah Knight PTA SFEORPT SFE   2024  8:00 AM Vadim Che, PT SFEORPT SFE   2024 10:00 AM POA INT [facility] AMB [facility]   2024  9:20 AM Lora Horowitz, SERA POAH GVL AMB   2024  1:00 PM Mikal Fuentes MD PM GVL AMB   2024  7:30 AM Candis Burns MD [facility] GVL AMB

## 2024-05-13 NOTE — PROGRESS NOTES
FOLLOW UP VISIT    Subjective:    Sandra Lopez (: 1970) is a 54 y.o., female,   Chief Complaint   Patient presents with    Follow-up       HPI:  HPI    54 year old in to follow up  High blood pressure  Iron def- Iron at 32 not taking it   Increased creat not hydrating  High cholesterol    High blood sugar- pre diabetes A1C 6   Anxiety and depression has Psych   ADHD - on meds  GERD on PPI   Vit d def- on vit d  Fatty liverm LFT nl   Allergies on meds  Back and knee pain goes to Ortho on gabapentin   HCM s/p CAROLINE BSO   Mammogram  Colonoscopy age 50 due 55   The following portions of the patient's history were reviewed and updated as appropriate:      Past Medical History:   Diagnosis Date    ADHD (attention deficit hyperactivity disorder) 24    Anxiety     Asthma     Depression     GERD (gastroesophageal reflux disease)     Hypertension     Psychiatric disorder     drpression/anxiety       Past Surgical History:   Procedure Laterality Date    HYSTERECTOMY, VAGINAL      UPPER GASTROINTESTINAL ENDOSCOPY         Family History   Problem Relation Age of Onset    Arthritis Mother     Breast Cancer Mother     Cancer Mother     Osteoporosis Mother     High Cholesterol Father     Cancer Brother     Depression Brother     Depression Sister     Mental Illness Sister        Social History     Socioeconomic History    Marital status:      Spouse name: Not on file    Number of children: Not on file    Years of education: Not on file    Highest education level: Not on file   Occupational History    Not on file   Tobacco Use    Smoking status: Former     Current packs/day: 0.00     Average packs/day: 1 pack/day for 9.0 years (9.0 ttl pk-yrs)     Types: Cigarettes     Start date: 2015     Quit date: 2023     Years since quittin.4    Smokeless tobacco: Not on file   Substance and Sexual Activity    Alcohol use: Not Currently    Drug use: No    Sexual activity: Yes     Partners: Male

## 2024-05-14 NOTE — PROGRESS NOTES
Name: Sandra Lopez  YOB: 1970  Gender: female  MRN: [id number]  Date of Encounter:  5/16/2024       CHIEF COMPLAINT:     Chief Complaint   Patient presents with    Injections     B Knees (Euflexxa #3)        SUBJECTIVE/OBJECTIVE:      HPI:    Patient is a 54 y.o. pleasant female who presents today for a Euflexxa injection of the bilateral knee, #3    Recall Hx:  Ms. Lopez comes in for pain in both knees but it is more severe on the right. She has right hip pain radiating down her leg to her ankle. Pain in the hip is both posterior and lateral. Knee pain is more lateral. She has swelling of the knee and grinding. Pain is throbbing and burning. She denies numbness. She has been taking mobic and robaxin. She has a hx of right PTTD with subtalar arthritis and was previously in a boot and did physical therapy for the foot and ankle in '22.      3/7/24: Recommended bilateral CSI, which she elected to proceed with. Also advised formal therapy and an evaluation with spine team.  4/18/24: She had acute pain following injection, then some relief that day, but no lasting pain relief with CSI. She has gone to 2 therapy visits and notes soreness, but she also feels she is weak with exercises and seems encouraged that this will help. She has seen Lora Horowitz about her back, who also recommended PT.     She is noting improvement in pain already.     1. Primary osteoarthritis of left knee    2. Primary osteoarthritis of right knee        Bilateral Knee Injection - US guided      An injection of Euflexxa viscosupplement was discussed today and is indicated for patient’s pain and condition today. Risks including infection, bleeding, nerve damage, and pain at the site of injection were discussed at length with the patient. Benefits including pain relief were also discussed with the patient. Patient verbalizes understanding of these and agrees to procedure. she consents to this procedure.Time-out was conducted with

## 2024-05-16 ENCOUNTER — OFFICE VISIT (OUTPATIENT)
Dept: ORTHOPEDIC SURGERY | Age: 54
End: 2024-05-16
Payer: COMMERCIAL

## 2024-05-16 ENCOUNTER — HOSPITAL ENCOUNTER (OUTPATIENT)
Dept: PHYSICAL THERAPY | Age: 54
Setting detail: RECURRING SERIES
Discharge: HOME | End: 2024-05-19
Attending: STUDENT IN AN ORGANIZED HEALTH CARE EDUCATION/TRAINING PROGRAM
Payer: COMMERCIAL

## 2024-05-16 DIAGNOSIS — M17.12 PRIMARY OSTEOARTHRITIS OF LEFT KNEE: Primary | ICD-10-CM

## 2024-05-16 DIAGNOSIS — M17.11 PRIMARY OSTEOARTHRITIS OF RIGHT KNEE: ICD-10-CM

## 2024-05-16 PROCEDURE — 20611 DRAIN/INJ JOINT/BURSA W/US: CPT | Performed by: STUDENT IN AN ORGANIZED HEALTH CARE EDUCATION/TRAINING PROGRAM

## 2024-05-16 PROCEDURE — 97110 THERAPEUTIC EXERCISES: CPT

## 2024-05-16 PROCEDURE — G0283 ELEC STIM OTHER THAN WOUND: HCPCS

## 2024-05-16 RX ORDER — HYALURONATE SODIUM 10 MG/ML
20 SYRINGE (ML) INTRAARTICULAR ONCE
Status: COMPLETED | OUTPATIENT
Start: 2024-05-16 | End: 2024-05-16

## 2024-05-16 RX ADMIN — Medication 20 MG: at 15:45

## 2024-05-16 RX ADMIN — Medication 20 MG: at 15:46

## 2024-05-16 ASSESSMENT — PAIN SCALES - GENERAL: PAINLEVEL_OUTOF10: 5

## 2024-05-16 NOTE — PROGRESS NOTES
Reviewed:  5/16/2024  Updated Objective Findings:  None Today  Treatment   THERAPEUTIC EXERCISE: (23 minutes):    Exercises per grid below to improve mobility and strength.  Required minimal verbal cues to promote proper body alignment and promote proper body mechanics.  Progressed resistance and repetitions as indicated.   Date:  05-6-17 Date:  05-09-24 Date:  05-16-24   Activity/Exercise Parameters     SKTC 3 reps  20 sec holds  B LE's 3 reps  20 sec holds  B LE's 3 reps  20 sec holds  B LE's   Active Hamstring Stretch 3 reps  20 sec holds  B LE's 3 reps  20 sec holds  B LE's 3 reps  20 sec holds  B LE's   Quad Sets 15 reps  5 sec holds  NOT TODAY     Lower Trunk Rotation X 10 reps 10 reps  5 sec holds  Bilaterally 10 reps  5 sec holds  Bilaterally   Piriformis stretch 3 x 10 sec holds bilaterally 3 reps  20 sec holds  B LE's 3 reps  20 sec holds  B LE's   Heel Slides 10 reps  5 sec holds  B LE's  NOT TODAY     NuStep Level 2  5 minutes Level 2  5 minutes Level 2  6 minutes   TKE with Theraband Green T-band  15 reps  B LE's NOT TODAY     LAQ's 15 reps  B LE's NOT TODAY  20 reps  B LE's   SAQ's 15 reps  B LE's  NOT TODAY     Hip Adduction with Ball 15 reps NOT TODAY     Theraband Clam Shells in Hook Lying Green T-band  15 reps  Green T-band  20 reps   Pelvic Tilts 15 reps  5 sec holds 15 reps  5 sec holds 15 reps  5 sec holds     MODALITIES:       *  Electrical Stimulation Therapy (15 minutes) was provided with intensity adjusted throughout treatment to patient tolerance. To low back in sitting x15 minutes with moist heat.  Skin clear afterwards.      Treatment/Session Summary:    Treatment Assessment:  Pt tolerated treatments well.  She was late for her appt again today.  She gets 2nd knee injections this afternoon.  Communication/Consultation:  None today  Equipment provided today:  None  Recommendations/Intent for next treatment session: Next visit will focus on manual therapy, progress stretching/strengthening

## 2024-05-20 ENCOUNTER — HOSPITAL ENCOUNTER (OUTPATIENT)
Dept: PHYSICAL THERAPY | Age: 54
Setting detail: RECURRING SERIES
Discharge: HOME | End: 2024-05-23
Attending: STUDENT IN AN ORGANIZED HEALTH CARE EDUCATION/TRAINING PROGRAM
Payer: COMMERCIAL

## 2024-05-20 PROCEDURE — G0283 ELEC STIM OTHER THAN WOUND: HCPCS

## 2024-05-20 PROCEDURE — 97110 THERAPEUTIC EXERCISES: CPT

## 2024-05-20 ASSESSMENT — PAIN SCALES - GENERAL: PAINLEVEL_OUTOF10: 5

## 2024-05-20 NOTE — PROGRESS NOTES
Sandra Lopez  : 1970  Primary: [facility] (Medicaid Managed)  Secondary:  [facility] @ [address]  [facility] [address]  Phone: [phone]  Fax: [phone] Plan Frequency: 2x/wk for 90 days    Plan of Care/Certification Expiration Date: 24        Plan of Care/Certification Expiration Date:  Plan of Care/Certification Expiration Date: 24    Frequency/Duration: Plan Frequency: 2x/wk for 90 days      Time In/Out:   Time In: 08  Time Out: 08      PT Visit Info:    Progress Note Due Date: 24  Total # of Visits to Date: 8  Progress Note Counter: 3  No Show: 2  Canceled Appointment: 4      Visit Count:  7   OUTPATIENT PHYSICAL THERAPY:   Treatment Note 2024       Episode  (PT: LBP, R Hip Pain, Bilateral Knee Pain)               Treatment Diagnosis:    Other low back pain  Pain in right hip  Right knee pain, unspecified chronicity  Left knee pain, unspecified chronicity  Medical/Referring Diagnosis:    Pain in both knees, unspecified chronicity  Chronic right-sided low back pain with right-sided sciatica  Right hip pain  Referring Physician:  Laura Pham MD MD Orders:  PT Eval and Treat   Return MD Appt:  24   Date of Onset:  LBP approximately 3 years duration; B knee pain approximately 2 months duration   Allergies:   Amoxicillin and Sulfa antibiotics  Restrictions/Precautions:   None      Interventions Planned (Treatment may consist of any combination of the following):     See Assessment Note    Subjective Comments: \"I have a migraine\"  Initial Pain Level::     5/10 Right hip  Post Session Pain Level:       5/10 R hip  Medications Last Reviewed:  2024  Updated Objective Findings:  None Today  Treatment   THERAPEUTIC EXERCISE: (23 minutes):    Exercises per grid below to improve mobility and strength.  Required minimal verbal cues to promote proper body alignment and promote proper body mechanics.  Progressed

## 2024-05-23 ENCOUNTER — HOSPITAL ENCOUNTER (OUTPATIENT)
Dept: PHYSICAL THERAPY | Age: 54
Setting detail: RECURRING SERIES
Discharge: HOME | End: 2024-05-26
Attending: STUDENT IN AN ORGANIZED HEALTH CARE EDUCATION/TRAINING PROGRAM
Payer: COMMERCIAL

## 2024-05-23 PROCEDURE — 97110 THERAPEUTIC EXERCISES: CPT

## 2024-05-23 PROCEDURE — G0283 ELEC STIM OTHER THAN WOUND: HCPCS

## 2024-05-23 ASSESSMENT — PAIN SCALES - GENERAL: PAINLEVEL_OUTOF10: 0

## 2024-05-23 NOTE — PROGRESS NOTES
Sandra Lopez  : 1970  Primary: [facility] (Medicaid Managed)  Secondary:  [facility] @ [address]  [facility] [address]  Phone: [phone]  Fax: [phone] Plan Frequency: 2x/wk for 90 days    Plan of Care/Certification Expiration Date: 24        Plan of Care/Certification Expiration Date:  Plan of Care/Certification Expiration Date: 24    Frequency/Duration: Plan Frequency: 2x/wk for 90 days      Time In/Out:   Time In: 08  Time Out: 08      PT Visit Info:    Progress Note Due Date: 24  Total # of Visits to Date: 8  Progress Note Counter: 3  No Show: 2  Canceled Appointment: 4      Visit Count:  7   OUTPATIENT PHYSICAL THERAPY:   Treatment Note 2024       Episode  (PT: LBP, R Hip Pain, Bilateral Knee Pain)               Treatment Diagnosis:    Other low back pain  Pain in right hip  Right knee pain, unspecified chronicity  Left knee pain, unspecified chronicity  Medical/Referring Diagnosis:    Pain in both knees, unspecified chronicity  Chronic right-sided low back pain with right-sided sciatica  Right hip pain  Referring Physician:  Laura Pham MD MD Orders:  PT Eval and Treat   Return MD Appt:  24   Date of Onset:  LBP approximately 3 years duration; B knee pain approximately 2 months duration   Allergies:   Amoxicillin and Sulfa antibiotics  Restrictions/Precautions:   None      Interventions Planned (Treatment may consist of any combination of the following):     See Assessment Note    Subjective Comments: \"No pain this morning, but last night I could not get comfortable\" \"I have not used my TENS unit yet\"  Initial Pain Level::     0/10 Right hip  Post Session Pain Level:       0/10 R hip  Medications Last Reviewed:  2024  Updated Objective Findings:  None Today  Treatment   THERAPEUTIC EXERCISE: (23 minutes):    Exercises per grid below to improve mobility and strength.  Required minimal verbal cues

## 2024-05-30 ENCOUNTER — HOSPITAL ENCOUNTER (OUTPATIENT)
Dept: PHYSICAL THERAPY | Age: 54
Setting detail: RECURRING SERIES
End: 2024-05-30
Attending: STUDENT IN AN ORGANIZED HEALTH CARE EDUCATION/TRAINING PROGRAM
Payer: COMMERCIAL

## 2024-05-30 PROCEDURE — 97110 THERAPEUTIC EXERCISES: CPT

## 2024-05-30 PROCEDURE — G0283 ELEC STIM OTHER THAN WOUND: HCPCS

## 2024-05-30 ASSESSMENT — PAIN SCALES - GENERAL: PAINLEVEL_OUTOF10: 5

## 2024-05-30 NOTE — PROGRESS NOTES
Sandra John  : 1970  Primary: [facility] (Medicaid Managed)  Secondary:  [facility] @ [address]  [facility] [address]  Phone: [phone]  Fax: [phone] Plan Frequency: 2x/wk for 90 days    Plan of Care/Certification Expiration Date: 24        Plan of Care/Certification Expiration Date:  Plan of Care/Certification Expiration Date: 24    Frequency/Duration: Plan Frequency: 2x/wk for 90 days      Time In/Out:   Time In: 805  Time Out: 845      PT Visit Info:    Progress Note Due Date: 24  Total # of Visits to Date: 10  Progress Note Counter: 5  No Show: 2  Canceled Appointment: 4      Visit Count:  10   OUTPATIENT PHYSICAL THERAPY:   Treatment Note 2024       Episode  (PT: LBP, R Hip Pain, Bilateral Knee Pain)               Treatment Diagnosis:    Other low back pain  Pain in right hip  Right knee pain, unspecified chronicity  Left knee pain, unspecified chronicity  Medical/Referring Diagnosis:    Pain in both knees, unspecified chronicity  Chronic right-sided low back pain with right-sided sciatica  Right hip pain  Referring Physician:  Laura Pham MD MD Orders:  PT Eval and Treat   Return MD Appt:  24   Date of Onset:  LBP approximately 3 years duration; B knee pain approximately 2 months duration   Allergies:   Amoxicillin and Sulfa antibiotics  Restrictions/Precautions:   None      Interventions Planned (Treatment may consist of any combination of the following):     See Assessment Note    Subjective Comments:  Pt states her blood work showed she has anemia.  She states MD put her on iron which made her sick so she stopped taking it.  She states she is going to ask MD if she can do infusions instead.  Pt reports continued back and LE pain today, 5/10.  She states her knees are hurting again too.  She states injections only helped for 2-3 days.    Initial Pain Level::     5/10 Right hip  Post Session Pain

## 2024-06-03 ENCOUNTER — HOSPITAL ENCOUNTER (OUTPATIENT)
Dept: PHYSICAL THERAPY | Age: 54
Setting detail: RECURRING SERIES
End: 2024-06-03
Attending: STUDENT IN AN ORGANIZED HEALTH CARE EDUCATION/TRAINING PROGRAM
Payer: COMMERCIAL

## 2024-06-03 NOTE — PROGRESS NOTES
Level:        /10 R hip  Medications Last Reviewed:  6/3/2024  Updated Objective Findings:  None Today  Treatment   THERAPEUTIC EXERCISE: (25 minutes):    Exercises per grid below to improve mobility and strength.  Required minimal verbal cues to promote proper body alignment and promote proper body mechanics.  Progressed resistance and repetitions as indicated.   Date:  05-09-24 Date:  05-23-24 Date:  06-3-24   Activity/Exercise      SKTC 3 reps  20 sec holds  B LE's 3 reps  20 sec holds  B LE's  NOT TODAY-Painful    Active Hamstring Stretch 3 reps  20 sec holds  B LE's 3 reps  20 sec holds  B LE's 3 reps  20 sec holds  B LE's   SAQ  X 15 reps 20 reps  B LE's   Lower Trunk Rotation 10 reps  5 sec holds  Bilaterally 10 reps  5 sec holds  Bilaterally 10 reps  5 sec holds  Bilaterally   Piriformis stretch 3 reps  20 sec holds  B LE's 3 reps  20 sec holds  B LE's 3 reps  20 sec holds  B LE's   NuStep Level 2  5 minutes Level 2  6 minutes Level 2  6 minutes   LAQ's  20 reps  B LE's    NOT TODAY-painful 20 reps  B LE's   Bridging  X 10 reps pause    Theraband Clam Shells in Hook Lying  blue T-band  20 reps Blue T-band  20 reps   Pelvic Tilts 15 reps  5 sec holds 15 reps  5 sec holds 15 reps  5 sec holds     MODALITIES: (15 minutes):       *  Electrical Stimulation Therapy (15 minutes) was provided with intensity adjusted throughout treatment to patient tolerance. To low back in sitting x15 minutes with moist heat.  Skin clear afterwards.      Treatment/Session Summary:    Treatment Assessment:  Pt tolerated treatments fairly.  She continues to have c/o knee and LBP.  Pt has attended 10 visits of PT since March.  She does not feel she is making any progress with PT and injections.  I encouraged her to follow up with MD due to continued pain.   Communication/Consultation:  None today  Equipment provided today:  None  Recommendations/Intent for next treatment session: Next visit will focus on manual therapy, progress

## 2024-06-07 DIAGNOSIS — F40.240 CLAUSTROPHOBIA: Primary | ICD-10-CM

## 2024-06-07 NOTE — TELEPHONE ENCOUNTER
----- Message from Lora Horowitz PA-C sent at 6/5/2024  5:42 PM EDT -----  Regarding: RE: MRI  Contact: [phone]  See if she can try MRI at [facility] which is faster and try Xanax.  ----- Message -----  From: Segundo Garcia MA  Sent: 6/5/2024  11:04 AM EDT  To: Lora Horowitz PA-C  Subject: FW: MRI                                          Please advise.  ----- Message -----  From: Sandra Lopez  Sent: 6/5/2024  10:42 AM EDT  To: #  Subject: MRI                                              I'm sorry couldn't do the MRI. I'm very claustrophobic. I took hydroxyzine and still couldn't. Could we do it at the hospital with sedation or a open MRI?

## 2024-06-07 NOTE — TELEPHONE ENCOUNTER
----- Message from Lora Horowitz PA-C sent at 6/5/2024  5:42 PM EDT -----  Regarding: RE: MRI  Contact: [phone]  See if she can try MRI at [facility] which is faster and try Xanax.  ----- Message -----  From: Segundo Garcia MA  Sent: 6/5/2024  11:04 AM EDT  To: Lora Horowitz PA-C  Subject: FW: MRI                                          Please advise.  ----- Message -----  From: Sandra Lopez  Sent: 6/5/2024  10:42 AM EDT  To: #  Subject: MRI                                              I'm sorry couldn't do the MRI. I'm very claustrophobic. I took hydroxyzine and still couldn't. Could we do it at the hospital with sedation or a open MRI?

## 2024-06-07 NOTE — TELEPHONE ENCOUNTER
Spoke to patient and discussed having her MRI performed at another facility with a shorter scan time and taking xanax before the procedure.  The patient is compliant and is willing to try.  I will send prescription to [facility] for signing and I will fax referral to [facility].

## 2024-06-10 ENCOUNTER — HOSPITAL ENCOUNTER (OUTPATIENT)
Dept: PHYSICAL THERAPY | Age: 54
Setting detail: RECURRING SERIES
Discharge: HOME | End: 2024-06-13
Attending: STUDENT IN AN ORGANIZED HEALTH CARE EDUCATION/TRAINING PROGRAM
Payer: COMMERCIAL

## 2024-06-10 PROCEDURE — G0283 ELEC STIM OTHER THAN WOUND: HCPCS

## 2024-06-10 PROCEDURE — 97110 THERAPEUTIC EXERCISES: CPT

## 2024-06-10 RX ORDER — ALPRAZOLAM 0.25 MG/1
TABLET ORAL
Qty: 1 TABLET | Refills: 0 | Status: SHIPPED | OUTPATIENT
Start: 2024-06-10 | End: 2024-06-21

## 2024-06-10 ASSESSMENT — PAIN SCALES - GENERAL: PAINLEVEL_OUTOF10: 5

## 2024-06-10 NOTE — PROGRESS NOTES
tolerable.    >Total Treatment Billable Duration:  40 minutes   Time In: 1015  Time Out: 1055    KELECHI KNIGHT PTA         Charge Capture  [employer] Portal  Appt Desk     Future Appointments   Date Time Provider Department Center   6/13/2024 11:30 AM Laura Pham MD [facility] AM[facility]   6/19/2024  1:00 PM Mikal Fuentes MD PM [facility] AMB   6/27/2024  2:45 PM Laura Pham MD [facility] AM[facility]   8/6/2024  7:30 AM Candis Burns MD [facility]

## 2024-06-12 NOTE — PROGRESS NOTES
plane to the US probe through the skin to the periosteum of bone. After confirmation of no aspiration of blood in the syringe, I proceeded with injection of 4 cc of 0.5% bupivacaine in that region. The medial superior genicular nerve block was performed. The medial distal femur was identified on US. The needle was inserted superior to inferior in plane to the US probe through the skin and superficial structures to periosteum. There was no aspiration of blood and syringe and injectate was placed into the target area.  Lastly, the inferior medial geniculate was targeted.  Proximal tibia was identified on ultrasound.  Needle was inserted inferior to superior in plane to the probe to the periosteum below the pes anserine tendon.  4 cc of 0.5% bupivacaine injectate was injected in the target region.     The patient tolerated this procedure well with no adverse events.  15 minutes after procedure the patient was reevaluated.  They reported near complete pain relief. The patient was counseled not to submerge the site for 24 hours, not to perform strenuous activity for the next five days, and if notes any signs or symptoms consistent with joint infection or allergic reaction to go to a local emergency room. The patient was observed for 15 minutes postprocedure and was allowed to be discharged from clinic in their usual state of health.      US guidance was used for procedural accuracy and guidance of injections. Imaging was saved.

## 2024-06-13 ENCOUNTER — OFFICE VISIT (OUTPATIENT)
Dept: ORTHOPEDIC SURGERY | Age: 54
End: 2024-06-13

## 2024-06-13 DIAGNOSIS — M17.11 PRIMARY OSTEOARTHRITIS OF RIGHT KNEE: ICD-10-CM

## 2024-06-13 DIAGNOSIS — M17.12 PRIMARY OSTEOARTHRITIS OF LEFT KNEE: Primary | ICD-10-CM

## 2024-06-14 ENCOUNTER — APPOINTMENT (OUTPATIENT)
Dept: GENERAL RADIOLOGY | Age: 54
End: 2024-06-14
Payer: COMMERCIAL

## 2024-06-14 ENCOUNTER — HOSPITAL ENCOUNTER (EMERGENCY)
Age: 54
Discharge: HOME | End: 2024-06-15
Attending: STUDENT IN AN ORGANIZED HEALTH CARE EDUCATION/TRAINING PROGRAM
Payer: COMMERCIAL

## 2024-06-14 DIAGNOSIS — F41.9 ANXIETY: ICD-10-CM

## 2024-06-14 DIAGNOSIS — I20.89 STABLE ANGINA (HCC): ICD-10-CM

## 2024-06-14 DIAGNOSIS — R06.02 SHORTNESS OF BREATH: Primary | ICD-10-CM

## 2024-06-14 LAB
ALBUMIN SERPL-MCNC: 3.8 G/DL (ref 3.5–5)
ALBUMIN/GLOB SERPL: 1.3 (ref 1–1.9)
ALP SERPL-CCNC: 113 U/L (ref 35–104)
ALT SERPL-CCNC: 13 U/L (ref 12–65)
ANION GAP SERPL CALC-SCNC: 17 MMOL/L (ref 9–18)
AST SERPL-CCNC: 14 U/L (ref 15–37)
BASOPHILS # BLD: 0 K/UL (ref 0–0.2)
BASOPHILS NFR BLD: 0 % (ref 0–2)
BILIRUB SERPL-MCNC: 0.2 MG/DL (ref 0–1.2)
BUN SERPL-MCNC: 18 MG/DL (ref 6–23)
CALCIUM SERPL-MCNC: 9.6 MG/DL (ref 8.8–10.2)
CHLORIDE SERPL-SCNC: 93 MMOL/L (ref 98–107)
CO2 SERPL-SCNC: 24 MMOL/L (ref 20–28)
CREAT SERPL-MCNC: 1.36 MG/DL (ref 0.6–1.1)
D DIMER PPP FEU-MCNC: 0.39 UG/ML(FEU)
DIFFERENTIAL METHOD BLD: ABNORMAL
EOSINOPHIL # BLD: 0.1 K/UL (ref 0–0.8)
EOSINOPHIL NFR BLD: 1 % (ref 0.5–7.8)
ERYTHROCYTE [DISTWIDTH] IN BLOOD BY AUTOMATED COUNT: 15 % (ref 11.9–14.6)
GLOBULIN SER CALC-MCNC: 3 G/DL (ref 2.3–3.5)
GLUCOSE SERPL-MCNC: 100 MG/DL (ref 70–99)
HCT VFR BLD AUTO: 36.9 % (ref 35.8–46.3)
HGB BLD-MCNC: 12 G/DL (ref 11.7–15.4)
IMM GRANULOCYTES # BLD AUTO: 0 K/UL (ref 0–0.5)
IMM GRANULOCYTES NFR BLD AUTO: 0 % (ref 0–5)
LYMPHOCYTES # BLD: 3.1 K/UL (ref 0.5–4.6)
LYMPHOCYTES NFR BLD: 24 % (ref 13–44)
MCH RBC QN AUTO: 28.7 PG (ref 26.1–32.9)
MCHC RBC AUTO-ENTMCNC: 32.5 G/DL (ref 31.4–35)
MCV RBC AUTO: 88.3 FL (ref 82–102)
MONOCYTES # BLD: 0.9 K/UL (ref 0.1–1.3)
MONOCYTES NFR BLD: 7 % (ref 4–12)
NEUTS SEG # BLD: 8.8 K/UL (ref 1.7–8.2)
NEUTS SEG NFR BLD: 68 % (ref 43–78)
NRBC # BLD: 0 K/UL (ref 0–0.2)
NT PRO BNP: <36 PG/ML (ref 0–125)
PLATELET # BLD AUTO: 391 K/UL (ref 150–450)
PMV BLD AUTO: 9.3 FL (ref 9.4–12.3)
POTASSIUM SERPL-SCNC: 3.9 MMOL/L (ref 3.5–5.1)
PROT SERPL-MCNC: 6.8 G/DL (ref 6.3–8.2)
RBC # BLD AUTO: 4.18 M/UL (ref 4.05–5.2)
SODIUM SERPL-SCNC: 134 MMOL/L (ref 136–145)
TROPONIN T SERPL HS-MCNC: <6 NG/L (ref 0–14)
WBC # BLD AUTO: 13 K/UL (ref 4.3–11.1)

## 2024-06-14 PROCEDURE — 83880 ASSAY OF NATRIURETIC PEPTIDE: CPT

## 2024-06-14 PROCEDURE — 84484 ASSAY OF TROPONIN QUANT: CPT

## 2024-06-14 PROCEDURE — 85025 COMPLETE CBC W/AUTO DIFF WBC: CPT

## 2024-06-14 PROCEDURE — 99285 EMERGENCY DEPT VISIT HI MDM: CPT

## 2024-06-14 PROCEDURE — 85379 FIBRIN DEGRADATION QUANT: CPT

## 2024-06-14 PROCEDURE — 71045 X-RAY EXAM CHEST 1 VIEW: CPT

## 2024-06-14 PROCEDURE — 6370000000 HC RX 637 (ALT 250 FOR IP): Performed by: STUDENT IN AN ORGANIZED HEALTH CARE EDUCATION/TRAINING PROGRAM

## 2024-06-14 PROCEDURE — 80053 COMPREHEN METABOLIC PANEL: CPT

## 2024-06-14 RX ORDER — HYDROXYZINE PAMOATE 25 MG/1
25 CAPSULE ORAL
Status: COMPLETED | OUTPATIENT
Start: 2024-06-14 | End: 2024-06-14

## 2024-06-14 RX ADMIN — HYDROXYZINE PAMOATE 25 MG: 25 CAPSULE ORAL at 23:23

## 2024-06-14 ASSESSMENT — LIFESTYLE VARIABLES
HOW MANY STANDARD DRINKS CONTAINING ALCOHOL DO YOU HAVE ON A TYPICAL DAY: PATIENT DOES NOT DRINK
HOW OFTEN DO YOU HAVE A DRINK CONTAINING ALCOHOL: NEVER

## 2024-06-14 ASSESSMENT — PAIN DESCRIPTION - LOCATION: LOCATION: CHEST

## 2024-06-14 ASSESSMENT — PAIN SCALES - GENERAL: PAINLEVEL_OUTOF10: 8

## 2024-06-15 VITALS
BODY MASS INDEX: 36.65 KG/M2 | WEIGHT: 220 LBS | TEMPERATURE: 98 F | DIASTOLIC BLOOD PRESSURE: 80 MMHG | RESPIRATION RATE: 15 BRPM | HEIGHT: 65 IN | SYSTOLIC BLOOD PRESSURE: 127 MMHG | OXYGEN SATURATION: 97 % | HEART RATE: 87 BPM

## 2024-06-15 LAB — TROPONIN T SERPL HS-MCNC: <6 NG/L (ref 0–14)

## 2024-06-15 RX ORDER — HYDROXYZINE PAMOATE 25 MG/1
25 CAPSULE ORAL 3 TIMES DAILY PRN
Qty: 30 CAPSULE | Refills: 0 | Status: SHIPPED | OUTPATIENT
Start: 2024-06-15 | End: 2024-06-29

## 2024-06-15 ASSESSMENT — PAIN DESCRIPTION - LOCATION: LOCATION: CHEST

## 2024-06-15 ASSESSMENT — HEART SCORE: ECG: NORMAL

## 2024-06-15 ASSESSMENT — PAIN SCALES - GENERAL: PAINLEVEL_OUTOF10: 6

## 2024-06-15 NOTE — FLOWSHEET NOTE
Patient mobility status  with no difficulty. Provider aware     I have reviewed discharge instructions with the patient.  The patient verbalized understanding.    Patient left ED via Discharge Method: ambulatory to Home with Child.    Opportunity for questions and clarification provided.     Patient given 0 scripts.

## 2024-06-15 NOTE — ED PROVIDER NOTES
[facility]  Emergency Department    DISPOSITION Decision To Discharge 06/15/2024 12:12:13 AM       ICD-10-CM    1. Shortness of breath  R06.02       2. Stable angina (HCC)  I20.89 [facility] [place]      3. Anxiety  F41.9         ED Course     ED Course as of 06/15/24 0047      2212 54-year-old female history of HTN, anxiety presents with 1 week of intermittent chest pain/shortness of breath.  Vitals reassuring; no hypoxia.  EKG without evidence of STEMI.  Symptoms more consistent with stable angina.  Cannot PERC out.  Will obtain labs including D-dimer and serial cardiac enzymes. [ER]    EKG: Normal sinus rhythm, rate 90.  No STEMI.  Normal axis and intervals.  Time: 0 [ER]   Sat Aries 15, 2024   0013 Feels improved on reassessment.  States she feels much more calm after the Vistaril.  No current symptoms at this time.  Blood work reassuring including negative D-dimer and negative troponin x 2.  CXR negative.  Will treat as stable angina.  Have placed urgent referral to cardiology.  Encouraged her to call their clinic after the weekend.  In the meantime I recommended she return to the ER if symptoms return or if she has any pain at rest.  She feels comfortable with this plan.  Return precautions given [ER]      ED Course User Index  [ER] Walter Bauman MD     Data Reviewed and Analyzed:  1 or more acute illnesses that pose a threat to life or bodily function.   Prescription drug management performed.  Patient was discharged risks and benefits of hospitalization were considered.  The following clinical decision tools were used in the care of this patient HEART score  PERC criteria.    I independently ordered and reviewed each unique test.   I interpreted the X-rays no obvious pneumothorax.  I interpreted the labs.  History: 1  EC  Patient Age: 1  Risk Factors: 1  Troponin: 0  Heart Score Total: 3    HPI   Sandra Lopez is a 54 y.o. female

## 2024-06-15 NOTE — ED TRIAGE NOTES
Patient reports shortness of breath that has progressively gotten worse over the past week. Pt states it gets worse with exertion. Pt also reports feeling like something is sitting on her chest.    Leeann Barragan RN

## 2024-06-15 NOTE — DISCHARGE INSTRUCTIONS
Your blood work and x-ray today were reassuring.  I have given you a referral for a cardiologist.  Please give their clinic a call on Monday morning to set up an appointment.  Return to the ER if any new or worsening symptoms or if you start to have any chest pain or shortness of breath while at rest.  I do recommend taking a daily aspirin 81 mg

## 2024-06-19 ENCOUNTER — OFFICE VISIT (OUTPATIENT)
Age: 54
End: 2024-06-19
Payer: COMMERCIAL

## 2024-06-19 DIAGNOSIS — M17.11 PRIMARY LOCALIZED OSTEOARTHRITIS OF RIGHT KNEE: Primary | ICD-10-CM

## 2024-06-19 DIAGNOSIS — M25.562 BILATERAL CHRONIC KNEE PAIN: ICD-10-CM

## 2024-06-19 DIAGNOSIS — M25.561 BILATERAL CHRONIC KNEE PAIN: ICD-10-CM

## 2024-06-19 DIAGNOSIS — G89.29 BILATERAL CHRONIC KNEE PAIN: ICD-10-CM

## 2024-06-19 PROCEDURE — 99204 OFFICE O/P NEW MOD 45 MIN: CPT | Performed by: ANESTHESIOLOGY

## 2024-06-19 NOTE — PROGRESS NOTES
Previous interventions:  Procedure Date Results   Bilateral Genicular block 6/13/24                                    Previous medication trials: Listed:  Class Medication Results   NSAIDs mobic    Oral steroids     Tylenol     Antiepileptics gabapentin    Antidepressants     Relaxants     Topicals/transdermaI patches     Narcotics       Previous tests/studies:  Study Date Results   XR Lumbar spine 3/27/24 Independent interpretation of AP, lateral and spot views of the lumbar spine:  3/27/24     AP of the lumbar spine shows rotation of the lower lumbar spine with her pelvis being in level.  There is degenerative changes of bilateral sacroiliac joints.  Sagittal view of the   Spine reveals grade 1 spondylolisthesis L4-5 and multilevel facet arthropathy degenerative changes.     X-ray impression: Degenerative changes lumbar spine with spondylolisthesis L4-5   XR Bilateral knee 3/7/24 XR with moderate medial compartment and severe patellofemoral arthritis.                          Previous therapies:  Type of Therapy Date Results   Physical therapy ([facility]) 3/20/24-                                     
HYSTERECTOMY, VAGINAL      UPPER GASTROINTESTINAL ENDOSCOPY          Family History   Problem Relation Age of Onset    Arthritis Mother     Breast Cancer Mother     Cancer Mother     Osteoporosis Mother     High Cholesterol Father     Cancer Brother     Depression Brother     Depression Sister     Mental Illness Sister            Social History     Socioeconomic History    Marital status:      Spouse name: Not on file    Number of children: Not on file    Years of education: Not on file    Highest education level: Not on file   Occupational History    Not on file   Tobacco Use    Smoking status: Former     Current packs/day: 0.00     Average packs/day: 1 pack/day for 9.0 years (9.0 ttl pk-yrs)     Types: Cigarettes     Start date: 2015     Quit date: 2023     Years since quittin.5    Smokeless tobacco: Not on file   Substance and Sexual Activity    Alcohol use: Not Currently    Drug use: No    Sexual activity: Yes     Partners: Male     Birth control/protection: Pill     Comment: , Rm Lopez   Other Topics Concern    Not on file   Social History Narrative    Not on file     Social Determinants of Health     Financial Resource Strain: Not on file   Food Insecurity: Not on file (2024)   Transportation Needs: Not on file   Physical Activity: Sufficiently Active (5/3/2024)    Exercise Vital Sign     Days of Exercise per Week: 5 days     Minutes of Exercise per Session: 120 min   Stress: Not on file   Social Connections: Not on file   Intimate Partner Violence: Not on file   Housing Stability: Not on file            Allergies   Allergen Reactions    Amoxicillin Rash     Other Reaction(s): Rash-Allergy    Sulfa Antibiotics Nausea And Vomiting           Outpatient Encounter Medications as of 2024   Medication Sig Dispense Refill    hydrOXYzine pamoate (VISTARIL) 25 MG capsule Take 1 capsule by mouth 3 times daily as needed for Itching 30 capsule 0    ALPRAZolam (XANAX) 0.25 MG

## 2024-06-21 ENCOUNTER — PATIENT MESSAGE (OUTPATIENT)
Dept: PRIMARY CARE CLINIC | Facility: CLINIC | Age: 54
End: 2024-06-21

## 2024-06-21 NOTE — TELEPHONE ENCOUNTER
From: Sandra Lopez  To: Dr. Candis Burns  Sent: 6/21/2024 9:55 AM EDT  Subject: Vitamin D-3    Could I get a refill on vitamin D3 please? FYI I was in the emergency room at [facility] Last Saturday. Shortness of breath, breaking out in a sweat with little exertion. Dizzy, and nauseated. Had numerous test run. All came back clear. The doctor still felt I needed to be checked out by a cardiologist. I have an appointment on Wednesday next week. The doctor prescribed me hydroxyzine as needed for anxiety.

## 2024-06-26 ENCOUNTER — INITIAL CONSULT (OUTPATIENT)
Age: 54
End: 2024-06-26
Payer: COMMERCIAL

## 2024-06-26 ENCOUNTER — PREP FOR PROCEDURE (OUTPATIENT)
Dept: CARDIOLOGY | Age: 54
End: 2024-06-26

## 2024-06-26 VITALS
SYSTOLIC BLOOD PRESSURE: 110 MMHG | BODY MASS INDEX: 36.82 KG/M2 | WEIGHT: 221 LBS | HEART RATE: 58 BPM | DIASTOLIC BLOOD PRESSURE: 80 MMHG | HEIGHT: 65 IN

## 2024-06-26 DIAGNOSIS — R06.02 SHORTNESS OF BREATH: ICD-10-CM

## 2024-06-26 DIAGNOSIS — I10 PRIMARY HYPERTENSION: Primary | ICD-10-CM

## 2024-06-26 DIAGNOSIS — R07.2 PRECORDIAL PAIN: ICD-10-CM

## 2024-06-26 DIAGNOSIS — I20.0 ACCELERATING ANGINA (HCC): ICD-10-CM

## 2024-06-26 PROCEDURE — 3079F DIAST BP 80-89 MM HG: CPT | Performed by: INTERNAL MEDICINE

## 2024-06-26 PROCEDURE — 93000 ELECTROCARDIOGRAM COMPLETE: CPT | Performed by: INTERNAL MEDICINE

## 2024-06-26 PROCEDURE — 99204 OFFICE O/P NEW MOD 45 MIN: CPT | Performed by: INTERNAL MEDICINE

## 2024-06-26 PROCEDURE — 3074F SYST BP LT 130 MM HG: CPT | Performed by: INTERNAL MEDICINE

## 2024-06-26 RX ORDER — SODIUM CHLORIDE 0.9 % (FLUSH) 0.9 %
5-40 SYRINGE (ML) INJECTION PRN
Status: CANCELLED | OUTPATIENT
Start: 2024-06-26

## 2024-06-26 RX ORDER — SODIUM CHLORIDE 9 MG/ML
INJECTION, SOLUTION INTRAVENOUS CONTINUOUS
Status: CANCELLED | OUTPATIENT
Start: 2024-06-26

## 2024-06-26 RX ORDER — SODIUM CHLORIDE 0.9 % (FLUSH) 0.9 %
5-40 SYRINGE (ML) INJECTION EVERY 12 HOURS SCHEDULED
Status: CANCELLED | OUTPATIENT
Start: 2024-06-26

## 2024-06-26 RX ORDER — SODIUM CHLORIDE 9 MG/ML
INJECTION, SOLUTION INTRAVENOUS PRN
Status: CANCELLED | OUTPATIENT
Start: 2024-06-26

## 2024-06-26 RX ORDER — LORAZEPAM 0.5 MG/1
0.5 TABLET ORAL
Status: CANCELLED | OUTPATIENT
Start: 2024-06-26 | End: 2024-06-27

## 2024-06-26 RX ORDER — ASPIRIN 325 MG
325 TABLET ORAL ONCE
Status: CANCELLED | OUTPATIENT
Start: 2024-06-26 | End: 2024-06-26

## 2024-06-26 NOTE — PROGRESS NOTES
[facility] CARDIOLOGY, [address], [address]  [place] [address]  PHONE: [phone]           HISTORY AND PHYSICAL          SUBJECTIVE:   Sandra Lopez is a 54 y.o. female seen for a consultation visit regarding the following:     Chief Complaint   Patient presents with    Consultation    Chest Pain    Shortness of Breath            HPI:    Increased sob for the last 2 weeks. Worse with exertion and better with rest. Lasts 15 minutes. Associated with diaphoresis and nausea and dizziness. No cp  No orthopnea or pnd. No palpitations or syncope.        Allergies   Allergen Reactions    Amoxicillin Rash     Other Reaction(s): Rash-Allergy    Sulfa Antibiotics Nausea And Vomiting     Past Medical History:   Diagnosis Date    ADHD (attention deficit hyperactivity disorder) 24    Anxiety     Asthma     Depression     GERD (gastroesophageal reflux disease)     Hypertension     Psychiatric disorder     drpression/anxiety     Past Surgical History:   Procedure Laterality Date    HYSTERECTOMY, VAGINAL      UPPER GASTROINTESTINAL ENDOSCOPY       Family History   Problem Relation Age of Onset    Arthritis Mother     Breast Cancer Mother     Cancer Mother     Osteoporosis Mother     High Cholesterol Father     Cancer Brother     Depression Brother     Depression Sister     Mental Illness Sister      Social History     Tobacco Use    Smoking status: Former     Current packs/day: 0.00     Average packs/day: 1 pack/day for 9.0 years (9.0 ttl pk-yrs)     Types: Cigarettes     Start date: 2015     Quit date: 2023     Years since quittin.5    Smokeless tobacco: Not on file   Substance Use Topics    Alcohol use: Not Currently         Current Outpatient Medications:     vitamin D3 (CHOLECALCIFEROL) 125 MCG (5000 UT) TABS tablet, Take 1 tablet by mouth daily, Disp: 90 tablet, Rfl: 1    hydrOXYzine pamoate (VISTARIL) 25 MG capsule, Take 1 capsule by mouth 3 times daily as needed for Itching, Disp: 30

## 2024-06-28 NOTE — PROGRESS NOTES
Patient pre-assessment complete for [facility] scheduled for , arrival time 0730. Patient verified using . Patient instructed to bring a list of all home medications on the day of procedure. NPO status reinforced. Patient informed to take a full dose aspirin 325mg  or 81 mg x 4 on the day of procedure. Patient instructed to HOLD adderall and lisinopril-hctz. Instructed they can take all other medications excluding vitamins & supplements. Patient verbalizes understanding of all instructions & denies any questions at this time.

## 2024-07-01 ENCOUNTER — APPOINTMENT (OUTPATIENT)
Dept: NON INVASIVE DIAGNOSTICS | Age: 54
End: 2024-07-01
Attending: INTERNAL MEDICINE
Payer: COMMERCIAL

## 2024-07-01 ENCOUNTER — HOSPITAL ENCOUNTER (OUTPATIENT)
Age: 54
Setting detail: OUTPATIENT SURGERY
Discharge: HOME | End: 2024-07-01
Attending: INTERNAL MEDICINE | Admitting: INTERNAL MEDICINE
Payer: COMMERCIAL

## 2024-07-01 VITALS
HEART RATE: 74 BPM | TEMPERATURE: 97.8 F | OXYGEN SATURATION: 96 % | SYSTOLIC BLOOD PRESSURE: 111 MMHG | RESPIRATION RATE: 16 BRPM | DIASTOLIC BLOOD PRESSURE: 77 MMHG | HEIGHT: 65 IN | BODY MASS INDEX: 36.82 KG/M2 | WEIGHT: 221 LBS

## 2024-07-01 DIAGNOSIS — R06.02 SHORTNESS OF BREATH: Chronic | ICD-10-CM

## 2024-07-01 DIAGNOSIS — R07.9 CHEST PAIN, UNSPECIFIED TYPE: Primary | ICD-10-CM

## 2024-07-01 DIAGNOSIS — I20.0 ACCELERATING ANGINA (HCC): ICD-10-CM

## 2024-07-01 LAB
ANION GAP SERPL CALC-SCNC: 11 MMOL/L (ref 9–18)
BUN SERPL-MCNC: 20 MG/DL (ref 6–23)
CALCIUM SERPL-MCNC: 9.3 MG/DL (ref 8.8–10.2)
CHLORIDE SERPL-SCNC: 100 MMOL/L (ref 98–107)
CO2 SERPL-SCNC: 27 MMOL/L (ref 20–28)
CREAT SERPL-MCNC: 1.51 MG/DL (ref 0.6–1.1)
ECHO AO ASC DIAM: 3.4 CM
ECHO AO ASCENDING AORTA INDEX: 1.65 CM/M2
ECHO AO ROOT DIAM: 3.3 CM
ECHO AO ROOT INDEX: 1.6 CM/M2
ECHO AV AREA PEAK VELOCITY: 2.5 CM2
ECHO AV AREA VTI: 2.5 CM2
ECHO AV AREA/BSA PEAK VELOCITY: 1.2 CM2/M2
ECHO AV AREA/BSA VTI: 1.2 CM2/M2
ECHO AV MEAN GRADIENT: 4 MMHG
ECHO AV MEAN VELOCITY: 1 M/S
ECHO AV PEAK GRADIENT: 8 MMHG
ECHO AV PEAK VELOCITY: 1.4 M/S
ECHO AV VELOCITY RATIO: 0.71
ECHO AV VTI: 29.2 CM
ECHO BSA: 2.14 M2
ECHO BSA: 2.14 M2
ECHO EST RA PRESSURE: 3 MMHG
ECHO IVC PROX: 1.5 CM
ECHO LA AREA 2C: 15.2 CM2
ECHO LA AREA 4C: 13.9 CM2
ECHO LA DIAMETER INDEX: 1.5 CM/M2
ECHO LA DIAMETER: 3.1 CM
ECHO LA MAJOR AXIS: 4.8 CM
ECHO LA MINOR AXIS: 4.7 CM
ECHO LA TO AORTIC ROOT RATIO: 0.94
ECHO LA VOL BP: 37 ML (ref 22–52)
ECHO LA VOL MOD A2C: 41 ML (ref 22–52)
ECHO LA VOL MOD A4C: 32 ML (ref 22–52)
ECHO LA VOL/BSA BIPLANE: 18 ML/M2 (ref 16–34)
ECHO LA VOLUME INDEX MOD A2C: 20 ML/M2 (ref 16–34)
ECHO LA VOLUME INDEX MOD A4C: 16 ML/M2 (ref 16–34)
ECHO LV E' LATERAL VELOCITY: 13 CM/S
ECHO LV E' SEPTAL VELOCITY: 9 CM/S
ECHO LV EDV A2C: 103 ML
ECHO LV EDV A4C: 98 ML
ECHO LV EDV INDEX A4C: 48 ML/M2
ECHO LV EDV NDEX A2C: 50 ML/M2
ECHO LV EJECTION FRACTION A2C: 58 %
ECHO LV EJECTION FRACTION A4C: 62 %
ECHO LV EJECTION FRACTION BIPLANE: 61 % (ref 55–100)
ECHO LV ESV A2C: 43 ML
ECHO LV ESV A4C: 38 ML
ECHO LV ESV INDEX A2C: 21 ML/M2
ECHO LV ESV INDEX A4C: 18 ML/M2
ECHO LV FRACTIONAL SHORTENING: 38 % (ref 28–44)
ECHO LV INTERNAL DIMENSION DIASTOLE INDEX: 2.18 CM/M2
ECHO LV INTERNAL DIMENSION DIASTOLIC: 4.5 CM (ref 3.9–5.3)
ECHO LV INTERNAL DIMENSION SYSTOLIC INDEX: 1.36 CM/M2
ECHO LV INTERNAL DIMENSION SYSTOLIC: 2.8 CM
ECHO LV IVSD: 0.8 CM (ref 0.6–0.9)
ECHO LV MASS 2D: 123.1 G (ref 67–162)
ECHO LV MASS INDEX 2D: 59.7 G/M2 (ref 43–95)
ECHO LV POSTERIOR WALL DIASTOLIC: 0.9 CM (ref 0.6–0.9)
ECHO LV RELATIVE WALL THICKNESS RATIO: 0.4
ECHO LVOT AREA: 3.5 CM2
ECHO LVOT AV VTI INDEX: 0.73
ECHO LVOT DIAM: 2.1 CM
ECHO LVOT MEAN GRADIENT: 2 MMHG
ECHO LVOT PEAK GRADIENT: 4 MMHG
ECHO LVOT PEAK VELOCITY: 1 M/S
ECHO LVOT STROKE VOLUME INDEX: 35.6 ML/M2
ECHO LVOT SV: 73.4 ML
ECHO LVOT VTI: 21.2 CM
ECHO MV A VELOCITY: 0.6 M/S
ECHO MV E DECELERATION TIME (DT): 264 MS
ECHO MV E VELOCITY: 0.71 M/S
ECHO MV E/A RATIO: 1.18
ECHO MV E/E' LATERAL: 5.46
ECHO MV E/E' RATIO (AVERAGED): 6.68
ECHO MV E/E' SEPTAL: 7.89
ECHO PV ACCELERATION TIME (AT): 108 MS
ECHO PV MAX VELOCITY: 1.2 M/S
ECHO PV PEAK GRADIENT: 5 MMHG
ECHO RIGHT VENTRICULAR SYSTOLIC PRESSURE (RVSP): 22 MMHG
ECHO RV BASAL DIMENSION: 4 CM
ECHO RV FREE WALL PEAK S': 12 CM/S
ECHO RV TAPSE: 2.7 CM (ref 1.7–?)
ECHO TV REGURGITANT MAX VELOCITY: 2.17 M/S
ECHO TV REGURGITANT PEAK GRADIENT: 19 MMHG
EKG ATRIAL RATE: 65 BPM
EKG DIAGNOSIS: NORMAL
EKG P AXIS: 37 DEGREES
EKG P-R INTERVAL: 182 MS
EKG Q-T INTERVAL: 404 MS
EKG QRS DURATION: 82 MS
EKG QTC CALCULATION (BAZETT): 420 MS
EKG R AXIS: 26 DEGREES
EKG T AXIS: 2 DEGREES
EKG VENTRICULAR RATE: 65 BPM
ERYTHROCYTE [DISTWIDTH] IN BLOOD BY AUTOMATED COUNT: 14.4 % (ref 11.9–14.6)
GLUCOSE SERPL-MCNC: 107 MG/DL (ref 70–99)
HCT VFR BLD AUTO: 36.1 % (ref 35.8–46.3)
HGB BLD-MCNC: 11.6 G/DL (ref 11.7–15.4)
MCH RBC QN AUTO: 29.6 PG (ref 26.1–32.9)
MCHC RBC AUTO-ENTMCNC: 32.1 G/DL (ref 31.4–35)
MCV RBC AUTO: 92.1 FL (ref 82–102)
NRBC # BLD: 0 K/UL (ref 0–0.2)
PLATELET # BLD AUTO: 313 K/UL (ref 150–450)
PMV BLD AUTO: 9.1 FL (ref 9.4–12.3)
POTASSIUM SERPL-SCNC: 4.1 MMOL/L (ref 3.5–5.1)
RBC # BLD AUTO: 3.92 M/UL (ref 4.05–5.2)
SODIUM SERPL-SCNC: 137 MMOL/L (ref 136–145)
WBC # BLD AUTO: 6.6 K/UL (ref 4.3–11.1)

## 2024-07-01 PROCEDURE — 93458 L HRT ARTERY/VENTRICLE ANGIO: CPT | Performed by: INTERNAL MEDICINE

## 2024-07-01 PROCEDURE — 2580000003 HC RX 258: Performed by: INTERNAL MEDICINE

## 2024-07-01 PROCEDURE — 6360000002 HC RX W HCPCS: Performed by: INTERNAL MEDICINE

## 2024-07-01 PROCEDURE — 93306 TTE W/DOPPLER COMPLETE: CPT

## 2024-07-01 PROCEDURE — 80048 BASIC METABOLIC PNL TOTAL CA: CPT

## 2024-07-01 PROCEDURE — C1769 GUIDE WIRE: HCPCS | Performed by: INTERNAL MEDICINE

## 2024-07-01 PROCEDURE — 6360000004 HC RX CONTRAST MEDICATION: Performed by: INTERNAL MEDICINE

## 2024-07-01 PROCEDURE — C1894 INTRO/SHEATH, NON-LASER: HCPCS | Performed by: INTERNAL MEDICINE

## 2024-07-01 PROCEDURE — 99152 MOD SED SAME PHYS/QHP 5/>YRS: CPT | Performed by: INTERNAL MEDICINE

## 2024-07-01 PROCEDURE — 93005 ELECTROCARDIOGRAM TRACING: CPT | Performed by: INTERNAL MEDICINE

## 2024-07-01 PROCEDURE — 85027 COMPLETE CBC AUTOMATED: CPT

## 2024-07-01 PROCEDURE — 93010 ELECTROCARDIOGRAM REPORT: CPT | Performed by: INTERNAL MEDICINE

## 2024-07-01 PROCEDURE — 93306 TTE W/DOPPLER COMPLETE: CPT | Performed by: INTERNAL MEDICINE

## 2024-07-01 PROCEDURE — 2709999900 HC NON-CHARGEABLE SUPPLY: Performed by: INTERNAL MEDICINE

## 2024-07-01 PROCEDURE — 2500000003 HC RX 250 WO HCPCS: Performed by: INTERNAL MEDICINE

## 2024-07-01 RX ORDER — SODIUM CHLORIDE 9 MG/ML
INJECTION, SOLUTION INTRAVENOUS ONCE
Status: COMPLETED | OUTPATIENT
Start: 2024-07-01 | End: 2024-07-01

## 2024-07-01 RX ORDER — SODIUM CHLORIDE 0.9 % (FLUSH) 0.9 %
5-40 SYRINGE (ML) INJECTION PRN
Status: CANCELLED | OUTPATIENT
Start: 2024-07-01

## 2024-07-01 RX ORDER — SODIUM CHLORIDE 0.9 % (FLUSH) 0.9 %
5-40 SYRINGE (ML) INJECTION EVERY 12 HOURS SCHEDULED
Status: CANCELLED | OUTPATIENT
Start: 2024-07-01

## 2024-07-01 RX ORDER — SODIUM CHLORIDE 9 MG/ML
INJECTION, SOLUTION INTRAVENOUS PRN
Status: CANCELLED | OUTPATIENT
Start: 2024-07-01

## 2024-07-01 RX ORDER — LIDOCAINE HYDROCHLORIDE 10 MG/ML
INJECTION, SOLUTION INFILTRATION; PERINEURAL PRN
Status: DISCONTINUED | OUTPATIENT
Start: 2024-07-01 | End: 2024-07-01 | Stop reason: HOSPADM

## 2024-07-01 RX ORDER — ASPIRIN 325 MG
325 TABLET ORAL ONCE
Status: DISCONTINUED | OUTPATIENT
Start: 2024-07-01 | End: 2024-07-01 | Stop reason: HOSPADM

## 2024-07-01 RX ORDER — MIDAZOLAM HYDROCHLORIDE 1 MG/ML
INJECTION INTRAMUSCULAR; INTRAVENOUS PRN
Status: DISCONTINUED | OUTPATIENT
Start: 2024-07-01 | End: 2024-07-01 | Stop reason: HOSPADM

## 2024-07-01 RX ORDER — HEPARIN SODIUM 200 [USP'U]/100ML
INJECTION, SOLUTION INTRAVENOUS CONTINUOUS PRN
Status: DISCONTINUED | OUTPATIENT
Start: 2024-07-01 | End: 2024-07-01 | Stop reason: HOSPADM

## 2024-07-01 RX ORDER — SODIUM CHLORIDE 9 MG/ML
INJECTION, SOLUTION INTRAVENOUS CONTINUOUS
Status: DISCONTINUED | OUTPATIENT
Start: 2024-07-01 | End: 2024-07-01 | Stop reason: HOSPADM

## 2024-07-01 RX ORDER — ACETAMINOPHEN 325 MG/1
650 TABLET ORAL EVERY 4 HOURS PRN
Status: CANCELLED | OUTPATIENT
Start: 2024-07-01

## 2024-07-01 RX ADMIN — SODIUM CHLORIDE: 9 INJECTION, SOLUTION INTRAVENOUS at 08:17

## 2024-07-01 RX ADMIN — SODIUM CHLORIDE: 9 INJECTION, SOLUTION INTRAVENOUS at 08:16

## 2024-07-01 NOTE — PROGRESS NOTES
Report received from [facility] RN. Procedural finding communicated. Intra procedural medication administration reviewed. Progression of care discussed.    Patient received into CPRU room 1, Post [facility] w/ Dr Bryan    Access site without bleeding or swelling. None noted    Patient instructed to limit movement of R upper extremity.    Routine post procedural vital signs & site assessment initiated.

## 2024-07-01 NOTE — PROGRESS NOTES
TRANSFER - OUT REPORT:    Verbal report given to RN on Sandra Lopez  being transferred to CPRU for routine progression of patient care       Report consisted of patient's Situation, Background, Assessment and   Recommendations(SBAR).     Information from the following report(s) Nurse Handoff Report and MAR was reviewed with the receiving nurse.    [facility] w/ Dr. Bryan  No interventions  R radial access  TR band to R radial @ 12mL  No s/sxs of bleeding or hematoma to R radial access site    Heparin 5,000 units IC  Versed 1mg IV  Fentanyl 25mcg IV

## 2024-07-01 NOTE — PROGRESS NOTES
Patient received to CPRU room # 15  Ambulatory from [facility]. Patient scheduled for [facility] today with Dr Bryan. Procedure reviewed & questions answered, voiced good understanding consent obtained & placed on chart. All medications and medical history reviewed. Will prep patient per orders. Patient & family updated on plan of care.      The patient has a fraility score of 3-MANAGING WELL, based on ambulation.    324mg of Aspirin taken at 0700

## 2024-07-01 NOTE — PROGRESS NOTES
Discharge instructions given per orders, voiced good understanding of R rad care, medications & follow up care. Denies any questions    R rad site C/D/I. Henna applied @ discharge.

## 2024-07-01 NOTE — PROGRESS NOTES
Radial compression band removed at approximately 1230 after slowly reducing air from 12 cc to zero as per hospital protocol.  No bleeding or hematoma noted. 2 x 2 gauze with tegaderm placed over puncture site. The affected extremity is warm and dry to the touch. Frequent vital signs printed and placed on bedside chart.  Patient instructed to call if any bleeding noted on gauze.  Patient verbalized understanding the nursing instructions.

## 2024-07-01 NOTE — DISCHARGE INSTRUCTIONS
operate heavy machinery for the remainder of the day     Rest when you feel tired. Getting enough sleep will help you recover.      Diet    You can eat your normal diet, unless your doctor gives you other instructions. If your stomach is upset, try clear liquids and bland, low-fat foods like plain toast or rice.     Drink plenty of fluids (unless your doctor tells you not to).     Don't drink alcohol for 24 hours.      Medicines    Be safe with medicines. Read and follow all instructions on the label.  If the doctor gave you a prescription medicine for pain, take it as prescribed.  If you are not taking a prescription pain medicine, ask your doctor if you can take an over-the-counter medicine.     If you think your pain medicine is making you sick to your stomach:  Take your medicine after meals (unless your doctor has told you not to).  Ask your doctor for a different pain medicine.       I have read the above instructions and have had the opportunity to ask questions.      Patient: ________________________   Date: _____________    Witness: _______________________   Date: _____________

## 2024-07-02 ENCOUNTER — PATIENT MESSAGE (OUTPATIENT)
Dept: PRIMARY CARE CLINIC | Facility: CLINIC | Age: 54
End: 2024-07-02

## 2024-07-02 NOTE — TELEPHONE ENCOUNTER
From: Sandra Lopez  To: Dr. Candis Burns  Sent: 7/2/2024 8:26 AM EDT  Subject: Kidneys     Lab work was done July 1st. My kidneys are worse. Dr. Bryan said I had kidney disease. I was wandering if you could refer me to a nephrologist please?

## 2024-07-03 ENCOUNTER — TELEMEDICINE (OUTPATIENT)
Dept: PRIMARY CARE CLINIC | Facility: CLINIC | Age: 54
End: 2024-07-03
Payer: COMMERCIAL

## 2024-07-03 DIAGNOSIS — K21.9 GASTROESOPHAGEAL REFLUX DISEASE WITHOUT ESOPHAGITIS: ICD-10-CM

## 2024-07-03 DIAGNOSIS — N18.9 CHRONIC KIDNEY DISEASE, UNSPECIFIED CKD STAGE: ICD-10-CM

## 2024-07-03 DIAGNOSIS — R73.9 HIGH BLOOD SUGAR: ICD-10-CM

## 2024-07-03 DIAGNOSIS — D50.9 IRON DEFICIENCY ANEMIA, UNSPECIFIED IRON DEFICIENCY ANEMIA TYPE: ICD-10-CM

## 2024-07-03 DIAGNOSIS — E78.00 HIGH CHOLESTEROL: ICD-10-CM

## 2024-07-03 DIAGNOSIS — M17.11 PRIMARY OSTEOARTHRITIS OF RIGHT KNEE: ICD-10-CM

## 2024-07-03 DIAGNOSIS — I10 HYPERTENSION, UNSPECIFIED TYPE: ICD-10-CM

## 2024-07-03 DIAGNOSIS — M17.12 PRIMARY OSTEOARTHRITIS OF LEFT KNEE: Primary | ICD-10-CM

## 2024-07-03 DIAGNOSIS — Z13.1 SCREENING FOR DIABETES MELLITUS: ICD-10-CM

## 2024-07-03 DIAGNOSIS — K76.0 STEATOSIS OF LIVER: ICD-10-CM

## 2024-07-03 DIAGNOSIS — I10 PRIMARY HYPERTENSION: Primary | ICD-10-CM

## 2024-07-03 PROCEDURE — 99214 OFFICE O/P EST MOD 30 MIN: CPT | Performed by: INTERNAL MEDICINE

## 2024-07-03 ASSESSMENT — ENCOUNTER SYMPTOMS: RESPIRATORY NEGATIVE: 1

## 2024-07-03 NOTE — PROGRESS NOTES
FOLLOW UP VISIT    Subjective:    Sandra Lopez (: 1970) is a 54 y.o., female,   Chief Complaint   Patient presents with    Discuss Labs       HPI:  HPI    54 year old in to follow up  High blood pressure  Iron def- Iron at 32 not taking it   Increased creat not hydrating- has not seen Npehor   High cholesterol    High blood sugar- pre diabetes A1C 6   Anxiety and depression has Psych   ADHD - on meds  GERD on PPI   Vit d def- on vit d  Fatty liverm LFT nl   Allergies on meds  Back and knee pain goes to Ortho on gabapentin   HCM s/p CAROLINE BSO   Mammogram  Colonoscopy age 50 due 55   The following portions of the patient's history were reviewed and updated as appropriate:      Past Medical History:   Diagnosis Date    ADHD (attention deficit hyperactivity disorder) 24    Anxiety     Asthma     Depression     GERD (gastroesophageal reflux disease)     Hypertension     Psychiatric disorder     drpression/anxiety       Past Surgical History:   Procedure Laterality Date    CARDIAC PROCEDURE N/A 2024    Left heart cath / coronary angiography performed by Efrain Bryan MD at [facility] CARDIAC CATH LAB    HYSTERECTOMY, VAGINAL      UPPER GASTROINTESTINAL ENDOSCOPY         Family History   Problem Relation Age of Onset    Arthritis Mother     Breast Cancer Mother     Cancer Mother     Osteoporosis Mother     High Cholesterol Father     Cancer Brother     Depression Brother     Depression Sister     Mental Illness Sister        Social History     Socioeconomic History    Marital status:      Spouse name: Not on file    Number of children: Not on file    Years of education: Not on file    Highest education level: Not on file   Occupational History    Not on file   Tobacco Use    Smoking status: Former     Current packs/day: 0.00     Average packs/day: 1 pack/day for 9.0 years (9.0 ttl pk-yrs)     Types: Cigarettes     Start date: 2015     Quit date: 2023     Years since

## 2024-07-30 ENCOUNTER — PATIENT MESSAGE (OUTPATIENT)
Age: 54
End: 2024-07-30

## 2024-07-30 DIAGNOSIS — M54.16 LUMBAR RADICULOPATHY: ICD-10-CM

## 2024-07-30 DIAGNOSIS — M47.816 FACET ARTHROPATHY, LUMBAR: ICD-10-CM

## 2024-07-30 DIAGNOSIS — M43.16 SPONDYLOLISTHESIS OF LUMBAR REGION: Primary | ICD-10-CM

## 2024-07-30 NOTE — TELEPHONE ENCOUNTER
From: Sandra Lopez  To: Lora Horowitz  Sent: 7/30/2024 11:17 AM EDT  Subject: MRI    I was wandering if I could get a referral to [facility]? They accept [facility], they have an open MRI.The referral for [facility] last Friday called they don't take [employer]. Thank you!!!

## 2024-07-31 ENCOUNTER — OFFICE VISIT (OUTPATIENT)
Dept: ORTHOPEDIC SURGERY | Age: 54
End: 2024-07-31
Payer: COMMERCIAL

## 2024-07-31 DIAGNOSIS — M17.12 OSTEOARTHRITIS OF LEFT KNEE, UNSPECIFIED OSTEOARTHRITIS TYPE: ICD-10-CM

## 2024-07-31 DIAGNOSIS — M17.11 OSTEOARTHRITIS OF RIGHT KNEE, UNSPECIFIED OSTEOARTHRITIS TYPE: Primary | ICD-10-CM

## 2024-07-31 PROCEDURE — 99203 OFFICE O/P NEW LOW 30 MIN: CPT | Performed by: PHYSICIAN ASSISTANT

## 2024-07-31 NOTE — PROGRESS NOTES
Procedure Date: 2024      Location: [facility]       Procedure: right genicular RFA.        Time Out performed prior to start of the procedure:       Mikal Fuentes M.D.  performed the following reviews on Sandra Lopez 1970 prior to the start of the procedure:       patient was identified by name and     agreement on procedure being performed was verified   risks and benefits explained to patient by the provider  procedure site verified as Right  patient was positioned for comfort   consent signed and verified for procedure       Time:  1:32 PM        Procedure performed by:   Mikal Fuentes M.D.       Patient assisted by:   HAILY MCKOY MA

## 2024-07-31 NOTE — PROGRESS NOTES
Name: Sandra Lopez  YOB: 1970  Gender: female  MRN: [id number]    CC:   Chief Complaint   Patient presents with    Joint Pain     Bilateral knee pain xray in chart          DIAGNOSIS:   Encounter Diagnoses   Name Primary?    Osteoarthritis of right knee, unspecified osteoarthritis type Yes    Osteoarthritis of left knee, unspecified osteoarthritis type         HPI:   The bilateral knee pain has been present for years and is becoming worse.  It hurts at night when sleeping.  The pain is located over the knees, R>L.  It does hurt to walk and gets worse with increased distances.   The pain does not radiate down the leg.  Numbness and tingling are not noted.   Treatment so far has been NSAIDs, IA cortisone, and HP with no relief.  She is scheduled for bilateral geniculate neurotomy with Dr. Fuentes in the coming weeks.      Current Outpatient Medications:     vitamin D3 (CHOLECALCIFEROL) 125 MCG (5000 UT) TABS tablet, Take 1 tablet by mouth daily, Disp: 90 tablet, Rfl: 1    iron-vitamin C (VITRON-C)  MG TABS, Take one daily (Patient not taking: Reported on 7/3/2024), Disp: 90 tablet, Rfl: 1    lisinopril-hydroCHLOROthiazide (PRINZIDE;ZESTORETIC) 10-12.5 MG per tablet, Take one daily, Disp: 90 tablet, Rfl: 1    albuterol sulfate HFA (PROVENTIL;VENTOLIN;PROAIR) 108 (90 Base) MCG/ACT inhaler, Inhale 2 puffs into the lungs every 6 hours as needed, Disp: , Rfl:     ADDERALL, 10MG, 10 MG tablet, , Disp: , Rfl:     cetirizine (ZYRTEC) 10 MG tablet, , Disp: , Rfl:     fluticasone (FLOVENT HFA) 220 MCG/ACT inhaler, Inhale 2 puffs into the lungs 2 times daily, Disp: , Rfl:     nystatin (MYCOSTATIN) 008100 UNIT/ML suspension, SWISH 1 TEASPOONFUL BY MOUTH FOR 2 MINUTES 4 TO 5 TIMES PER DAY THEN EXPECTORATE, Disp: , Rfl:     pantoprazole (PROTONIX) 40 MG tablet, Take 1 tablet by mouth daily, Disp: , Rfl:     propranolol (INDERAL LA) 60 MG extended release capsule, Take 1 capsule by mouth at bedtime, Disp: ,

## 2024-08-01 RX ORDER — FLUOXETINE HYDROCHLORIDE 40 MG/1
40 CAPSULE ORAL DAILY
COMMUNITY
Start: 2024-07-05 | End: 2024-08-06 | Stop reason: ALTCHOICE

## 2024-08-06 ENCOUNTER — OFFICE VISIT (OUTPATIENT)
Dept: PRIMARY CARE CLINIC | Facility: CLINIC | Age: 54
End: 2024-08-06
Payer: COMMERCIAL

## 2024-08-06 VITALS
HEIGHT: 65 IN | BODY MASS INDEX: 36.42 KG/M2 | OXYGEN SATURATION: 97 % | WEIGHT: 218.6 LBS | SYSTOLIC BLOOD PRESSURE: 100 MMHG | HEART RATE: 67 BPM | DIASTOLIC BLOOD PRESSURE: 64 MMHG

## 2024-08-06 DIAGNOSIS — Z00.00 ENCOUNTER FOR WELL ADULT EXAM WITHOUT ABNORMAL FINDINGS: ICD-10-CM

## 2024-08-06 DIAGNOSIS — Z00.00 ANNUAL PHYSICAL EXAM: Primary | ICD-10-CM

## 2024-08-06 DIAGNOSIS — Z00.00 ANNUAL PHYSICAL EXAM: ICD-10-CM

## 2024-08-06 DIAGNOSIS — Z12.4 SCREENING FOR CERVICAL CANCER: ICD-10-CM

## 2024-08-06 LAB
ALBUMIN SERPL-MCNC: 3.7 G/DL (ref 3.5–5)
ALBUMIN/GLOB SERPL: 1.2 (ref 1–1.9)
ALP SERPL-CCNC: 103 U/L (ref 35–104)
ALT SERPL-CCNC: 15 U/L (ref 12–65)
ANION GAP SERPL CALC-SCNC: 11 MMOL/L (ref 9–18)
AST SERPL-CCNC: 17 U/L (ref 15–37)
BASOPHILS # BLD: 0.1 K/UL (ref 0–0.2)
BASOPHILS NFR BLD: 1 % (ref 0–2)
BILIRUB SERPL-MCNC: 0.4 MG/DL (ref 0–1.2)
BUN SERPL-MCNC: 17 MG/DL (ref 6–23)
CALCIUM SERPL-MCNC: 9.4 MG/DL (ref 8.8–10.2)
CHLORIDE SERPL-SCNC: 100 MMOL/L (ref 98–107)
CHOLEST SERPL-MCNC: 192 MG/DL (ref 0–200)
CO2 SERPL-SCNC: 28 MMOL/L (ref 20–28)
CREAT SERPL-MCNC: 1.35 MG/DL (ref 0.6–1.1)
DIFFERENTIAL METHOD BLD: ABNORMAL
EOSINOPHIL # BLD: 0.1 K/UL (ref 0–0.8)
EOSINOPHIL NFR BLD: 2 % (ref 0.5–7.8)
ERYTHROCYTE [DISTWIDTH] IN BLOOD BY AUTOMATED COUNT: 14.8 % (ref 11.9–14.6)
GLOBULIN SER CALC-MCNC: 3 G/DL (ref 2.3–3.5)
GLUCOSE SERPL-MCNC: 105 MG/DL (ref 70–99)
HCT VFR BLD AUTO: 37.9 % (ref 35.8–46.3)
HDLC SERPL-MCNC: 57 MG/DL (ref 40–60)
HDLC SERPL: 3.4 (ref 0–5)
HGB BLD-MCNC: 12 G/DL (ref 11.7–15.4)
IMM GRANULOCYTES # BLD AUTO: 0 K/UL (ref 0–0.5)
IMM GRANULOCYTES NFR BLD AUTO: 0 % (ref 0–5)
LDLC SERPL CALC-MCNC: 110 MG/DL (ref 0–100)
LYMPHOCYTES # BLD: 2.4 K/UL (ref 0.5–4.6)
LYMPHOCYTES NFR BLD: 33 % (ref 13–44)
MCH RBC QN AUTO: 29.6 PG (ref 26.1–32.9)
MCHC RBC AUTO-ENTMCNC: 31.7 G/DL (ref 31.4–35)
MCV RBC AUTO: 93.3 FL (ref 82–102)
MONOCYTES # BLD: 0.6 K/UL (ref 0.1–1.3)
MONOCYTES NFR BLD: 8 % (ref 4–12)
NEUTS SEG # BLD: 4.1 K/UL (ref 1.7–8.2)
NEUTS SEG NFR BLD: 56 % (ref 43–78)
NRBC # BLD: 0 K/UL (ref 0–0.2)
PLATELET # BLD AUTO: 358 K/UL (ref 150–450)
PMV BLD AUTO: 10 FL (ref 9.4–12.3)
POTASSIUM SERPL-SCNC: 4 MMOL/L (ref 3.5–5.1)
PROT SERPL-MCNC: 6.6 G/DL (ref 6.3–8.2)
RBC # BLD AUTO: 4.06 M/UL (ref 4.05–5.2)
SODIUM SERPL-SCNC: 139 MMOL/L (ref 136–145)
TRIGL SERPL-MCNC: 126 MG/DL (ref 0–150)
TSH, 3RD GENERATION: 3.73 UIU/ML (ref 0.27–4.2)
VLDLC SERPL CALC-MCNC: 25 MG/DL (ref 6–23)
WBC # BLD AUTO: 7.3 K/UL (ref 4.3–11.1)

## 2024-08-06 PROCEDURE — 3078F DIAST BP <80 MM HG: CPT | Performed by: INTERNAL MEDICINE

## 2024-08-06 PROCEDURE — 3074F SYST BP LT 130 MM HG: CPT | Performed by: INTERNAL MEDICINE

## 2024-08-06 PROCEDURE — 99396 PREV VISIT EST AGE 40-64: CPT | Performed by: INTERNAL MEDICINE

## 2024-08-06 RX ORDER — ASPIRIN 81 MG/1
81 TABLET, CHEWABLE ORAL DAILY
COMMUNITY

## 2024-08-06 ASSESSMENT — ENCOUNTER SYMPTOMS
EYE DISCHARGE: 0
ANAL BLEEDING: 0
BLOOD IN STOOL: 0
RHINORRHEA: 0
ABDOMINAL DISTENTION: 0
EYE PAIN: 0
WHEEZING: 0
EYE REDNESS: 0
DIARRHEA: 0
EYES NEGATIVE: 1
CHOKING: 0
COLOR CHANGE: 0
ABDOMINAL PAIN: 0
NAUSEA: 0
STRIDOR: 0
COUGH: 0
GASTROINTESTINAL NEGATIVE: 1
CONSTIPATION: 0
SHORTNESS OF BREATH: 0
RESPIRATORY NEGATIVE: 1
ALLERGIC/IMMUNOLOGIC NEGATIVE: 1
SINUS PRESSURE: 0
EYE ITCHING: 0
CHEST TIGHTNESS: 0
RECTAL PAIN: 0
BACK PAIN: 0

## 2024-08-06 NOTE — PROGRESS NOTES
FOLLOW UP VISIT    Subjective:    Sandra Lopez (: 1970) is a 54 y.o., female,   Chief Complaint   Patient presents with    Annual Exam     fasting       HPI:  54 year old in for cpe          The following portions of the patient's history were reviewed and updated as appropriate:      Past Medical History:   Diagnosis Date    ADHD (attention deficit hyperactivity disorder) 24    Anxiety     Asthma     Depression     GERD (gastroesophageal reflux disease)     Hypertension     Psychiatric disorder     drpression/anxiety       Past Surgical History:   Procedure Laterality Date    CARDIAC PROCEDURE N/A 2024    Left heart cath / coronary angiography performed by Efrain Bryan MD at [facility] CARDIAC CATH LAB    HYSTERECTOMY, VAGINAL      UPPER GASTROINTESTINAL ENDOSCOPY         Family History   Problem Relation Age of Onset    Arthritis Mother     Breast Cancer Mother     Cancer Mother     Osteoporosis Mother     High Cholesterol Father     Cancer Brother     Depression Brother     Depression Sister     Mental Illness Sister        Social History     Socioeconomic History    Marital status:      Spouse name: Not on file    Number of children: Not on file    Years of education: Not on file    Highest education level: Not on file   Occupational History    Not on file   Tobacco Use    Smoking status: Former     Current packs/day: 0.00     Average packs/day: 1 pack/day for 9.0 years (9.0 ttl pk-yrs)     Types: Cigarettes     Start date: 2015     Quit date: 2023     Years since quittin.6    Smokeless tobacco: Not on file   Substance and Sexual Activity    Alcohol use: Not Currently    Drug use: No    Sexual activity: Yes     Partners: Male     Birth control/protection: Pill     Comment: , Rm Lopez   Other Topics Concern    Not on file   Social History Narrative    Not on file     Social Determinants of Health     Financial Resource Strain: Not on file   Food

## 2024-08-07 ENCOUNTER — PATIENT MESSAGE (OUTPATIENT)
Dept: PRIMARY CARE CLINIC | Facility: CLINIC | Age: 54
End: 2024-08-07

## 2024-08-07 LAB
APPEARANCE UR: ABNORMAL
BACTERIA URNS QL MICRO: ABNORMAL /HPF
BILIRUB UR QL: ABNORMAL
CASTS URNS QL MICRO: 0 /LPF
COLOR UR: YELLOW
CRYSTALS URNS QL MICRO: 0 /LPF
EPI CELLS #/AREA URNS HPF: ABNORMAL /HPF
GLUCOSE UR STRIP.AUTO-MCNC: NEGATIVE MG/DL
HGB UR QL STRIP: NEGATIVE
KETONES UR QL STRIP.AUTO: NEGATIVE MG/DL
LEUKOCYTE ESTERASE UR QL STRIP.AUTO: NEGATIVE
MUCOUS THREADS URNS QL MICRO: 0 /LPF
NITRITE UR QL STRIP.AUTO: NEGATIVE
OTHER OBSERVATIONS: ABNORMAL
PH UR STRIP: 6.5 (ref 5–9)
PROT UR STRIP-MCNC: ABNORMAL MG/DL
RBC #/AREA URNS HPF: ABNORMAL /HPF
SP GR UR REFRACTOMETRY: 1.02 (ref 1–1.02)
URINE CULTURE IF INDICATED: ABNORMAL
UROBILINOGEN UR QL STRIP.AUTO: 0.2 EU/DL (ref 0.2–1)
WBC URNS QL MICRO: ABNORMAL /HPF
YEAST URNS QL MICRO: ABNORMAL

## 2024-08-07 NOTE — TELEPHONE ENCOUNTER
From: Sandra Lopez  To: Dr. Candis Burns  Sent: 8/7/2024 12:34 PM EDT  Subject: Culture    What kind of infection do I have? I'm really sick to my stomach and throwing up

## 2024-08-08 ENCOUNTER — PATIENT MESSAGE (OUTPATIENT)
Age: 54
End: 2024-08-08

## 2024-08-08 ENCOUNTER — TELEMEDICINE (OUTPATIENT)
Dept: PRIMARY CARE CLINIC | Facility: CLINIC | Age: 54
End: 2024-08-08
Payer: COMMERCIAL

## 2024-08-08 ENCOUNTER — OFFICE VISIT (OUTPATIENT)
Dept: ORTHOPEDIC SURGERY | Age: 54
End: 2024-08-08

## 2024-08-08 DIAGNOSIS — M17.11 OSTEOARTHRITIS OF RIGHT KNEE, UNSPECIFIED OSTEOARTHRITIS TYPE: Primary | ICD-10-CM

## 2024-08-08 DIAGNOSIS — E78.00 HIGH CHOLESTEROL: ICD-10-CM

## 2024-08-08 DIAGNOSIS — N39.0 URINARY TRACT INFECTION WITHOUT HEMATURIA, SITE UNSPECIFIED: Primary | ICD-10-CM

## 2024-08-08 DIAGNOSIS — R73.9 HIGH BLOOD SUGAR: ICD-10-CM

## 2024-08-08 PROCEDURE — 99214 OFFICE O/P EST MOD 30 MIN: CPT | Performed by: INTERNAL MEDICINE

## 2024-08-08 RX ORDER — NITROFURANTOIN 25; 75 MG/1; MG/1
100 CAPSULE ORAL 2 TIMES DAILY
Qty: 6 CAPSULE | Refills: 0 | Status: SHIPPED | OUTPATIENT
Start: 2024-08-08 | End: 2024-08-11

## 2024-08-08 ASSESSMENT — ENCOUNTER SYMPTOMS: RESPIRATORY NEGATIVE: 1

## 2024-08-08 NOTE — PROGRESS NOTES
KASSANDRA    NAME: Sandra Lopez   ID:[id number]   :1970    Location: POA    Procedure:right Genicular Nerve Radiofrequency Ablation Under Fluoroscopic Imaging  Lateral Retinacular Nerve Branch of the Sciatic Nerve.  Medial Retinacular Nerve Branch of the Femoral Nerve  lnfrapatellar Nerve Branch of the Saphenous Nerve       Pre-op Diagnosis: Knee Osteoarthritis    Post-op Diagnosis: Same     Anesthesia: Local only     Complications: None    After confirming written and informed consent and discussing the risk, benefits and alternatives for the procedure, the patient had the correct site marked by the physician performing the procedure. The specific risks of bleeding, infection and nerve injury were discussed. The patient was taken to the fluoroscopy suite.    The patient was placed in the supine position. A pulse oximeter was placed, and verbal and visual monitoring were maintained throughout the procedure. The physician cleaned his hands with an alcohol containing  solution. The physician wore a hat and mask, sterile gloves were applied. The skin overlying the right knee was cleaned with chlorhexadine gluconate. Sterile towels were applied as a drape. A timeout was performed involving the patient, physician and radiology technician.    A20 G, 10 cm cannula with 5 mm active tip was used for each nerve.    The expected path of the lateral retinacular nerve (superiolateral geniculate nerve), a branch of the sciatic nerve. was then identified using fluoroscopy. The skin overlying the superiolateral femoral condyle was anesthetized with 1% lidocaine via a 25 G 1.5 inch needle. Next, using a coaxial technique with intermittent fluoroscopic guidance the radiofrequency cannula was advanced toward the lateral midpoint of the femoral shaft at the junction of the superiolateral femoral condyle and shaft.    Next, the expected path of the medial retinacular nerve, a branch of the femoral nerve (superiomedial

## 2024-08-08 NOTE — PROGRESS NOTES
FOLLOW UP VISIT    Subjective:    Sandra Lopez (: 1970) is a 54 y.o., female,   Chief Complaint   Patient presents with    Follow-up       HPI:  HPI    54 year old in to follow up.  She had high  worse then 100.  A1C needs to be done Cr5 1.35 has Nephro.  She is having sx of UTI   High blood pressure  Iron def- Iron at 32 not taking it   Increased creat not hydrating- has seen Npehor   High cholesterol    High blood sugar- pre diabetes A1C 6 - recheck   Anxiety and depression has Psych   ADHD - on meds  GERD on PPI   Vit d def- on vit d  Fatty liverm LFT nl   Allergies on meds  Back and knee pain goes to Ortho on gabapentin   HCM s/p CAROLINE BSO   Mammogram  Colonoscopy age 50 due 55   The following portions of the patient's history were reviewed and updated as appropriate:      Past Medical History:   Diagnosis Date    ADHD (attention deficit hyperactivity disorder) 24    Anxiety     Asthma     Depression     GERD (gastroesophageal reflux disease)     Hypertension     Psychiatric disorder     drpression/anxiety       Past Surgical History:   Procedure Laterality Date    CARDIAC PROCEDURE N/A 2024    Left heart cath / coronary angiography performed by Efrain Bryan MD at [facility] CARDIAC CATH LAB    HYSTERECTOMY, VAGINAL      UPPER GASTROINTESTINAL ENDOSCOPY         Family History   Problem Relation Age of Onset    Arthritis Mother     Breast Cancer Mother     Cancer Mother     Osteoporosis Mother     High Cholesterol Father     Cancer Brother     Depression Brother     Depression Sister     Mental Illness Sister        Social History     Socioeconomic History    Marital status:      Spouse name: Not on file    Number of children: Not on file    Years of education: Not on file    Highest education level: Not on file   Occupational History    Not on file   Tobacco Use    Smoking status: Former     Current packs/day: 0.00     Average packs/day: 1 pack/day for 9.0 years

## 2024-08-12 ENCOUNTER — CLINICAL DOCUMENTATION (OUTPATIENT)
Age: 54
End: 2024-08-12

## 2024-08-12 NOTE — TELEPHONE ENCOUNTER
Clinical documentation    Patient completed physical therapy from  3/20/24 to 7/2/24 with 11 visits total.  Also completed a home exercise program during this time.

## 2024-08-19 DIAGNOSIS — R73.9 HIGH BLOOD SUGAR: ICD-10-CM

## 2024-08-19 DIAGNOSIS — N39.0 URINARY TRACT INFECTION WITHOUT HEMATURIA, SITE UNSPECIFIED: ICD-10-CM

## 2024-08-19 LAB
APPEARANCE UR: CLEAR
BACTERIA URNS QL MICRO: ABNORMAL /HPF
BILIRUB UR QL: NEGATIVE
CASTS URNS QL MICRO: 0 /LPF
COLOR UR: ABNORMAL
CRYSTALS URNS QL MICRO: 0 /LPF
EPI CELLS #/AREA URNS HPF: ABNORMAL /HPF (ref 0–5)
EST. AVERAGE GLUCOSE BLD GHB EST-MCNC: 118 MG/DL
GLUCOSE UR STRIP.AUTO-MCNC: NEGATIVE MG/DL
HBA1C MFR BLD: 5.8 % (ref 0–5.6)
HGB UR QL STRIP: NEGATIVE
HYALINE CASTS URNS QL MICRO: ABNORMAL /LPF
KETONES UR QL STRIP.AUTO: NEGATIVE MG/DL
LEUKOCYTE ESTERASE UR QL STRIP.AUTO: NEGATIVE
MUCOUS THREADS URNS QL MICRO: 0 /LPF
NITRITE UR QL STRIP.AUTO: NEGATIVE
PH UR STRIP: 5.5 (ref 5–9)
PROT UR STRIP-MCNC: NEGATIVE MG/DL
RBC #/AREA URNS HPF: ABNORMAL /HPF (ref 0–5)
SP GR UR REFRACTOMETRY: 1.01 (ref 1–1.02)
URINE CULTURE IF INDICATED: ABNORMAL
UROBILINOGEN UR QL STRIP.AUTO: 0.2 EU/DL (ref 0.2–1)
WBC URNS QL MICRO: ABNORMAL /HPF (ref 0–4)

## 2024-08-20 ENCOUNTER — OFFICE VISIT (OUTPATIENT)
Dept: PRIMARY CARE CLINIC | Facility: CLINIC | Age: 54
End: 2024-08-20
Payer: COMMERCIAL

## 2024-08-20 VITALS
HEIGHT: 65 IN | SYSTOLIC BLOOD PRESSURE: 102 MMHG | HEART RATE: 68 BPM | DIASTOLIC BLOOD PRESSURE: 72 MMHG | WEIGHT: 216 LBS | BODY MASS INDEX: 35.99 KG/M2 | OXYGEN SATURATION: 99 %

## 2024-08-20 DIAGNOSIS — N39.0 URINARY TRACT INFECTION WITHOUT HEMATURIA, SITE UNSPECIFIED: ICD-10-CM

## 2024-08-20 DIAGNOSIS — N39.0 URINARY TRACT INFECTION WITHOUT HEMATURIA, SITE UNSPECIFIED: Primary | ICD-10-CM

## 2024-08-20 PROCEDURE — 99214 OFFICE O/P EST MOD 30 MIN: CPT | Performed by: INTERNAL MEDICINE

## 2024-08-20 PROCEDURE — 3078F DIAST BP <80 MM HG: CPT | Performed by: INTERNAL MEDICINE

## 2024-08-20 PROCEDURE — 3074F SYST BP LT 130 MM HG: CPT | Performed by: INTERNAL MEDICINE

## 2024-08-20 RX ORDER — CIPROFLOXACIN 500 MG/1
500 TABLET, FILM COATED ORAL 2 TIMES DAILY
Qty: 6 TABLET | Refills: 0 | Status: SHIPPED | OUTPATIENT
Start: 2024-08-20 | End: 2024-08-23

## 2024-08-20 ASSESSMENT — ENCOUNTER SYMPTOMS: RESPIRATORY NEGATIVE: 1

## 2024-08-20 NOTE — PROGRESS NOTES
take iron consider COLO sooner  Increased creat not hydrating increased hydration go to Nephrology   High cholesterol  diet and exercise  High blood sugar- pre diabetes A1C 5.7 and was 6 diet and exericse is losing weight  Anxiety and depression has Psych   ADHD - on meds  GERD on PPI   Vit d def- on vit d  Fatty liverm LFT nl   Allergies on meds  Back and knee pain goes to Ortho on gabapentin   HCM s/p CAROLINE BSO   Mammogram 2023 Colonoscopy age 50 due 55    Check labs  C/w meds- increase hydration , take iron and follow up  She does go to Ortho and Psych   Has had a CAROLINE/BSO   EGD and COLO done               The patient and/or patient representative voiced understanding and agreement with the current diagnoses, recommendations, and possible side effects.    No follow-up provider specified.      Candis Burns MD  Physical Exam

## 2024-08-22 LAB
BACTERIA SPEC CULT: NORMAL
BACTERIA SPEC CULT: NORMAL
SERVICE CMNT-IMP: NORMAL

## 2024-08-27 ENCOUNTER — OFFICE VISIT (OUTPATIENT)
Age: 54
End: 2024-08-27
Payer: COMMERCIAL

## 2024-08-27 VITALS
SYSTOLIC BLOOD PRESSURE: 128 MMHG | HEIGHT: 65 IN | BODY MASS INDEX: 36.15 KG/M2 | HEART RATE: 60 BPM | WEIGHT: 217 LBS | DIASTOLIC BLOOD PRESSURE: 80 MMHG

## 2024-08-27 DIAGNOSIS — I10 HYPERTENSION, UNSPECIFIED TYPE: ICD-10-CM

## 2024-08-27 DIAGNOSIS — R06.02 SHORTNESS OF BREATH: Primary | Chronic | ICD-10-CM

## 2024-08-27 PROCEDURE — 99214 OFFICE O/P EST MOD 30 MIN: CPT | Performed by: INTERNAL MEDICINE

## 2024-08-27 PROCEDURE — 3079F DIAST BP 80-89 MM HG: CPT | Performed by: INTERNAL MEDICINE

## 2024-08-27 PROCEDURE — 3074F SYST BP LT 130 MM HG: CPT | Performed by: INTERNAL MEDICINE

## 2024-08-27 RX ORDER — LISINOPRIL 10 MG/1
10 TABLET ORAL DAILY
Qty: 90 TABLET | Refills: 3 | Status: SHIPPED | OUTPATIENT
Start: 2024-08-27

## 2024-08-27 NOTE — PROGRESS NOTES
[facility] CARDIOLOGY  [address] [place] [address], [place] [address]  PHONE: [phone]      24    NAME:  Sandra Lopez  : 1970  MRN: [id number]       SUBJECTIVE:   Sandra Lopez is a 54 y.o. female seen for a follow up visit regarding the following:     Chief Complaint   Patient presents with    Hypertension         HPI:    No cp. No orthopnea or pnd. No palpitations or syncope.  Some dizziness and low am bp    Past Medical History, Past Surgical History, Family history, Social History, and Medications were all reviewed with the patient today and updated as necessary.     Current Outpatient Medications   Medication Sig Dispense Refill    lisinopril (PRINIVIL;ZESTRIL) 10 MG tablet Take 1 tablet by mouth daily 90 tablet 3    NONFORMULARY 2 each daily [place] chewable with iron      aspirin 81 MG chewable tablet Take 1 tablet by mouth daily      Probiotic Product (PROBIOTIC DAILY PO) Take by mouth daily      vitamin D3 (CHOLECALCIFEROL) 125 MCG (5000 UT) TABS tablet Take 1 tablet by mouth daily 90 tablet 1    albuterol sulfate HFA (PROVENTIL;VENTOLIN;PROAIR) 108 (90 Base) MCG/ACT inhaler Inhale 2 puffs into the lungs every 6 hours as needed      ADDERALL, 10MG, 10 MG tablet Take 1 tablet by mouth 2 times daily.      cetirizine (ZYRTEC) 10 MG tablet       fluticasone (FLOVENT HFA) 220 MCG/ACT inhaler Inhale 2 puffs into the lungs 2 times daily      pantoprazole (PROTONIX) 40 MG tablet Take 1 tablet by mouth daily      propranolol (INDERAL LA) 60 MG extended release capsule Take 1 capsule by mouth at bedtime      buPROPion (WELLBUTRIN XL) 300 MG extended release tablet Take 1 tablet by mouth every morning      gabapentin (NEURONTIN) 100 MG capsule Take 1 capsule by mouth 3 times daily for 30 days. (Patient taking differently: Take 1 capsule by mouth nightly.) 90 capsule 0    Hyoscyamine Sulfate SL 0.125 MG SUBL Place 1 tablet under the tongue as needed      iron-vitamin C (VITRON-C)

## 2024-09-16 ENCOUNTER — OFFICE VISIT (OUTPATIENT)
Age: 54
End: 2024-09-16

## 2024-09-16 DIAGNOSIS — M25.562 BILATERAL CHRONIC KNEE PAIN: Primary | ICD-10-CM

## 2024-09-16 DIAGNOSIS — M25.561 BILATERAL CHRONIC KNEE PAIN: Primary | ICD-10-CM

## 2024-09-16 DIAGNOSIS — G89.29 BILATERAL CHRONIC KNEE PAIN: Primary | ICD-10-CM

## 2024-09-16 RX ORDER — METHOCARBAMOL 750 MG/1
750 TABLET, FILM COATED ORAL 3 TIMES DAILY PRN
Qty: 90 TABLET | Refills: 2 | Status: SHIPPED | OUTPATIENT
Start: 2024-09-16 | End: 2024-12-15

## 2024-09-16 ASSESSMENT — ENCOUNTER SYMPTOMS
EYES NEGATIVE: 1
ALLERGIC/IMMUNOLOGIC NEGATIVE: 1
SHORTNESS OF BREATH: 0
ABDOMINAL PAIN: 0

## 2024-10-21 ENCOUNTER — OFFICE VISIT (OUTPATIENT)
Age: 54
End: 2024-10-21
Payer: COMMERCIAL

## 2024-10-21 ENCOUNTER — TELEPHONE (OUTPATIENT)
Age: 54
End: 2024-10-21

## 2024-10-21 DIAGNOSIS — M25.561 BILATERAL CHRONIC KNEE PAIN: Primary | ICD-10-CM

## 2024-10-21 DIAGNOSIS — G89.29 CHRONIC BILATERAL LOW BACK PAIN WITH RIGHT-SIDED SCIATICA: ICD-10-CM

## 2024-10-21 DIAGNOSIS — M54.41 CHRONIC BILATERAL LOW BACK PAIN WITH RIGHT-SIDED SCIATICA: ICD-10-CM

## 2024-10-21 DIAGNOSIS — M25.562 BILATERAL CHRONIC KNEE PAIN: Primary | ICD-10-CM

## 2024-10-21 DIAGNOSIS — G89.29 BILATERAL CHRONIC KNEE PAIN: Primary | ICD-10-CM

## 2024-10-21 PROCEDURE — 99214 OFFICE O/P EST MOD 30 MIN: CPT | Performed by: PHYSICIAN ASSISTANT

## 2024-10-21 RX ORDER — FUROSEMIDE 20 MG/1
TABLET ORAL
Qty: 60 TABLET | Refills: 3 | Status: SHIPPED | OUTPATIENT
Start: 2024-10-21

## 2024-10-21 ASSESSMENT — ENCOUNTER SYMPTOMS
ABDOMINAL PAIN: 0
SHORTNESS OF BREATH: 0
EYES NEGATIVE: 1
ALLERGIC/IMMUNOLOGIC NEGATIVE: 1

## 2024-10-21 NOTE — PROGRESS NOTES
Chronic Pain Consult Note      Plan:     A comprehensive pain management plan may consist of the following: Testing, Therapy, Medications, Interventions, Consults, and Follow up.    Chronic right knee pain/osteoarthritis  Ongoing patellofemoral pain - possibly L4 radiculopathy  S/p right genicular RFA with 100% pain relief initially, with 70% improvement overall   Status post right genicular nerve blocks with ultrasound guidance with Dr. Laura Pham.  Patient reports 90% improvement pain and function.  Patient is been nonresponsive to further conservative therapy including medication physical therapy.  Chronic low back pain  Reorder lumbar MRI at [facility] for open MRI  PT notes to be sent as she has completed 10 visits of PT for her low back pain with ongoing pain and possible RLE radic in L4 distribution.   DESI pending results of MRI    General Recommendations: The pain condition that the patient suffers from is best treated with a multidisciplinary approach that involves an increase in physical activity to prevent de-conditioning and worsening of the pain cycle, as well as psychological counseling (formal and/or informal) to address the co morbid psychological effects of pain. Treatment will often involve judicious use of pain medications and interventional procedures to decrease the pain, allowing the patient to participate in the physical activity that will ultimately produce long-lasting pain reductions. The goal of the multidisciplinary approach is to return the patient to a higher level of overall function and to restore their ability to perform activities of daily living.      Referring Provider: No ref. provider found  Assessment:      Chief Complaint: Follow-up (5 week f/u)      Sandra Lopez is a 54 y.o. female [facility] for the following diagnoses:    Diagnosis:  1. Bilateral chronic knee pain    2. Chronic bilateral low back pain with right-sided sciatica

## 2024-10-21 NOTE — TELEPHONE ENCOUNTER
Pt informed. She thanks and verbalizes understanding.     Requested Prescriptions     Signed Prescriptions Disp Refills    furosemide (LASIX) 20 MG tablet 60 tablet 3     Sig: Take 1 tablet by mouth daily as needed for swelling     Authorizing Provider: TRISH ROWAN     Ordering User: ASHWIN MACK

## 2024-10-21 NOTE — TELEPHONE ENCOUNTER
Pt c/o swelling in ankles since Friday. Pt states that there is a constant pain in both ankles. The swelling does not go down at night or with elevation. Pt denies any redness. Pt does have some shortness of breath, but states she has asthma.     HCTZ was discontinued at last visit.     Next appt with Dr. Bryan on 11/25/24.

## 2024-10-21 NOTE — TELEPHONE ENCOUNTER
Patient called stating she has the following concerns :    Swelling again in both ankles  More pronounced in the right ankle  Onset on Friday  Accompanied by mild pain

## 2024-10-23 ENCOUNTER — OFFICE VISIT (OUTPATIENT)
Dept: OBGYN CLINIC | Age: 54
End: 2024-10-23
Payer: COMMERCIAL

## 2024-10-23 VITALS
WEIGHT: 213 LBS | DIASTOLIC BLOOD PRESSURE: 78 MMHG | BODY MASS INDEX: 35.49 KG/M2 | HEIGHT: 65 IN | SYSTOLIC BLOOD PRESSURE: 122 MMHG

## 2024-10-23 DIAGNOSIS — Z01.419 WOMEN'S ANNUAL ROUTINE GYNECOLOGICAL EXAMINATION: ICD-10-CM

## 2024-10-23 DIAGNOSIS — Z80.3 FAMILY HISTORY OF BREAST CANCER: ICD-10-CM

## 2024-10-23 DIAGNOSIS — B37.31 CANDIDIASIS OF VULVA: ICD-10-CM

## 2024-10-23 DIAGNOSIS — Z12.31 SCREENING MAMMOGRAM FOR BREAST CANCER: Primary | ICD-10-CM

## 2024-10-23 PROBLEM — N93.9 ABNORMAL UTERINE BLEEDING: Status: RESOLVED | Noted: 2019-06-04 | Resolved: 2024-10-23

## 2024-10-23 PROCEDURE — 3078F DIAST BP <80 MM HG: CPT | Performed by: NURSE PRACTITIONER

## 2024-10-23 PROCEDURE — 99459 PELVIC EXAMINATION: CPT | Performed by: NURSE PRACTITIONER

## 2024-10-23 PROCEDURE — 3074F SYST BP LT 130 MM HG: CPT | Performed by: NURSE PRACTITIONER

## 2024-10-23 PROCEDURE — 99396 PREV VISIT EST AGE 40-64: CPT | Performed by: NURSE PRACTITIONER

## 2024-10-23 RX ORDER — NYSTATIN 100000 U/G
CREAM TOPICAL
Qty: 30 G | Refills: 1 | Status: SHIPPED | OUTPATIENT
Start: 2024-10-23

## 2024-10-23 NOTE — ASSESSMENT & PLAN NOTE
To left groin between groin/labia minora  We discuss keeping area clean/dry (pt works outdoors doing lawn work)  Nystatin rx sent prn

## 2024-10-24 NOTE — PROGRESS NOTES
Chaperone for Intimate Exam     Chaperone was offer accepted as part of the rooming process    Chaperone: Monica Ontiveros  
I have reviewed the patient's visit today including history, exam and assessment by RUBEN Liz.  I agree with treatment/plan as above.    Dionicio Carmona MD  8:25 AM  10/24/24   
New patient here for AE.     LAST PAP:  s/p hysterectomy, unknown if ovaries are still there.     LAST MAMMO:  2023 at [facility]     LMP:  No LMP recorded. Patient has had a hysterectomy.    BIRTH CONTROL:  status post hysterectomy    TOBACCO USE:  No    FAMILY HISTORY OF:   Breast Cancer:  Yes-mother dx around 68    Ovarian Cancer:  No   Uterine Cancer:  No   Colon Cancer:  No    Vitals:    10/23/24 0947   BP: 122/78   Site: Left Upper Arm   Position: Sitting   Weight: 96.6 kg (213 lb)   Height: 1.651 m (5' 5\")        PAULINE TRIPATHI RN  10/23/24  10:00 AM    
   furosemide (LASIX) 20 MG tablet Take 1 tablet by mouth daily as needed for swelling 60 tablet 3    methocarbamol (ROBAXIN-750) 750 MG tablet Take 1 tablet by mouth 3 times daily as needed (back pain) 90 tablet 2    lisinopril (PRINIVIL;ZESTRIL) 10 MG tablet Take 1 tablet by mouth daily 90 tablet 3    NONFORMULARY 2 each daily [place] chewable with iron      aspirin 81 MG chewable tablet Take 1 tablet by mouth daily      Probiotic Product (PROBIOTIC DAILY PO) Take by mouth daily      vitamin D3 (CHOLECALCIFEROL) 125 MCG (5000 UT) TABS tablet Take 1 tablet by mouth daily 90 tablet 1    iron-vitamin C (VITRON-C)  MG TABS Take one daily 90 tablet 1    albuterol sulfate HFA (PROVENTIL;VENTOLIN;PROAIR) 108 (90 Base) MCG/ACT inhaler Inhale 2 puffs into the lungs every 6 hours as needed      ADDERALL, 10MG, 10 MG tablet Take 1 tablet by mouth 2 times daily.      cetirizine (ZYRTEC) 10 MG tablet       fluticasone (FLOVENT HFA) 220 MCG/ACT inhaler Inhale 2 puffs into the lungs 2 times daily      pantoprazole (PROTONIX) 40 MG tablet Take 1 tablet by mouth daily      propranolol (INDERAL LA) 60 MG extended release capsule Take 1 capsule by mouth at bedtime      buPROPion (WELLBUTRIN XL) 300 MG extended release tablet Take 1 tablet by mouth every morning      gabapentin (NEURONTIN) 100 MG capsule Take 1 capsule by mouth 3 times daily for 30 days. (Patient taking differently: Take 1 capsule by mouth nightly.) 90 capsule 0    Hyoscyamine Sulfate SL 0.125 MG SUBL Place 1 tablet under the tongue as needed      nystatin (MYCOSTATIN) 654881 UNIT/ML suspension SWISH 1 TEASPOONFUL BY MOUTH FOR 2 MINUTES 4 TO 5 TIMES PER DAY THEN EXPECTORATE (Patient not taking: Reported on 8/20/2024)       No facility-administered encounter medications on file as of 10/23/2024.

## 2024-10-25 ENCOUNTER — TELEPHONE (OUTPATIENT)
Age: 54
End: 2024-10-25

## 2024-10-25 NOTE — TELEPHONE ENCOUNTER
Spoke with pt who c/o nausea, vomiting, diarrhea, and abdominal plain. Pt believes it is from the lasix. She was to only take it 3 days and then use PRN. Pt is going to stop lasix and call back PRN. Pt advised to go to urgent care or ER over weekend for worsening symptoms.

## 2024-10-25 NOTE — TELEPHONE ENCOUNTER
Pt is complaining about the medication Lasix saying is making her sick and stomach pains. She said today will be her last day taken the medication. She need someone to call her. She said she is drinking a lot of water as requested. She want to stop taking the medication

## 2024-11-18 ENCOUNTER — PATIENT MESSAGE (OUTPATIENT)
Age: 54
End: 2024-11-18

## 2024-11-18 DIAGNOSIS — M54.41 CHRONIC BILATERAL LOW BACK PAIN WITH RIGHT-SIDED SCIATICA: Primary | ICD-10-CM

## 2024-11-18 DIAGNOSIS — F40.240 CLAUSTROPHOBIA: ICD-10-CM

## 2024-11-18 DIAGNOSIS — G89.29 CHRONIC BILATERAL LOW BACK PAIN WITH RIGHT-SIDED SCIATICA: Primary | ICD-10-CM

## 2024-11-19 RX ORDER — DIAZEPAM 5 MG/1
TABLET ORAL
Qty: 2 TABLET | Refills: 0 | Status: SHIPPED | OUTPATIENT
Start: 2024-11-19 | End: 2024-11-25

## 2024-11-22 PROBLEM — Z01.419 WOMEN'S ANNUAL ROUTINE GYNECOLOGICAL EXAMINATION: Status: RESOLVED | Noted: 2024-10-23 | Resolved: 2024-11-22

## 2024-11-25 ENCOUNTER — OFFICE VISIT (OUTPATIENT)
Age: 54
End: 2024-11-25
Payer: COMMERCIAL

## 2024-11-25 VITALS
WEIGHT: 213 LBS | HEART RATE: 62 BPM | BODY MASS INDEX: 35.49 KG/M2 | HEIGHT: 65 IN | SYSTOLIC BLOOD PRESSURE: 120 MMHG | DIASTOLIC BLOOD PRESSURE: 60 MMHG

## 2024-11-25 DIAGNOSIS — R60.0 LEG EDEMA: ICD-10-CM

## 2024-11-25 DIAGNOSIS — I10 PRIMARY HYPERTENSION: Primary | ICD-10-CM

## 2024-11-25 DIAGNOSIS — R06.02 SHORTNESS OF BREATH: Chronic | ICD-10-CM

## 2024-11-25 PROCEDURE — 3074F SYST BP LT 130 MM HG: CPT | Performed by: INTERNAL MEDICINE

## 2024-11-25 PROCEDURE — 3078F DIAST BP <80 MM HG: CPT | Performed by: INTERNAL MEDICINE

## 2024-11-25 PROCEDURE — 99214 OFFICE O/P EST MOD 30 MIN: CPT | Performed by: INTERNAL MEDICINE

## 2024-11-25 NOTE — PROGRESS NOTES
edema  Stable.Continue lasix prn         Return in about 6 months (around 5/25/2025).         TRISH RWOAN MD  11/25/2024  8:48 AM     [FreeTextEntry1] : reviewed cardiology eval, stress echo and carotid dopplers from 9/18

## 2024-12-12 ENCOUNTER — OFFICE VISIT (OUTPATIENT)
Age: 54
End: 2024-12-12
Payer: COMMERCIAL

## 2024-12-12 DIAGNOSIS — G89.29 CHRONIC BILATERAL LOW BACK PAIN WITH RIGHT-SIDED SCIATICA: Primary | ICD-10-CM

## 2024-12-12 DIAGNOSIS — M54.41 CHRONIC BILATERAL LOW BACK PAIN WITH RIGHT-SIDED SCIATICA: Primary | ICD-10-CM

## 2024-12-12 PROCEDURE — 99214 OFFICE O/P EST MOD 30 MIN: CPT | Performed by: PHYSICIAN ASSISTANT

## 2024-12-12 ASSESSMENT — ENCOUNTER SYMPTOMS
ALLERGIC/IMMUNOLOGIC NEGATIVE: 1
ABDOMINAL PAIN: 0
SHORTNESS OF BREATH: 0
EYES NEGATIVE: 1

## 2024-12-12 NOTE — PROGRESS NOTES
Ms. Lopez is a follow-up after being unable to complete lumbar MRI despite being given preprocedure Valium.  
  Neurological:  Negative for dizziness.   Hematological: Negative.    Psychiatric/Behavioral: Negative.           All 11 systems reviewed and were negative.          The [place] database for controlled substance prescription monitoring was reviewed.    Date: December 12, 2024  Patient Name: Sandra Lopez  MRN:[id number]  PCP: Candis Burns personally performed the HPI, exam, and assessment/plan, verified the documentation and approve it is updated, accurate, and complete. Parts or the entirety of this document were transcribed utilizing voice recognition software. Transcription errors may be present.    Sahyy Gonzalez PA-C

## 2025-01-24 SDOH — ECONOMIC STABILITY: FOOD INSECURITY: WITHIN THE PAST 12 MONTHS, THE FOOD YOU BOUGHT JUST DIDN'T LAST AND YOU DIDN'T HAVE MONEY TO GET MORE.: SOMETIMES TRUE

## 2025-01-24 SDOH — ECONOMIC STABILITY: FOOD INSECURITY: WITHIN THE PAST 12 MONTHS, YOU WORRIED THAT YOUR FOOD WOULD RUN OUT BEFORE YOU GOT MONEY TO BUY MORE.: SOMETIMES TRUE

## 2025-01-24 SDOH — ECONOMIC STABILITY: INCOME INSECURITY: IN THE LAST 12 MONTHS, WAS THERE A TIME WHEN YOU WERE NOT ABLE TO PAY THE MORTGAGE OR RENT ON TIME?: YES

## 2025-01-24 SDOH — ECONOMIC STABILITY: TRANSPORTATION INSECURITY
IN THE PAST 12 MONTHS, HAS THE LACK OF TRANSPORTATION KEPT YOU FROM MEDICAL APPOINTMENTS OR FROM GETTING MEDICATIONS?: NO

## 2025-01-24 ASSESSMENT — PATIENT HEALTH QUESTIONNAIRE - PHQ9
SUM OF ALL RESPONSES TO PHQ QUESTIONS 1-9: 2
SUM OF ALL RESPONSES TO PHQ QUESTIONS 1-9: 2
SUM OF ALL RESPONSES TO PHQ9 QUESTIONS 1 & 2: 2
SUM OF ALL RESPONSES TO PHQ QUESTIONS 1-9: 2
2. FEELING DOWN, DEPRESSED OR HOPELESS: SEVERAL DAYS
1. LITTLE INTEREST OR PLEASURE IN DOING THINGS: SEVERAL DAYS
2. FEELING DOWN, DEPRESSED OR HOPELESS: SEVERAL DAYS
SUM OF ALL RESPONSES TO PHQ QUESTIONS 1-9: 2
1. LITTLE INTEREST OR PLEASURE IN DOING THINGS: SEVERAL DAYS
SUM OF ALL RESPONSES TO PHQ9 QUESTIONS 1 & 2: 2

## 2025-01-27 ENCOUNTER — OFFICE VISIT (OUTPATIENT)
Dept: PRIMARY CARE CLINIC | Facility: CLINIC | Age: 55
End: 2025-01-27

## 2025-01-27 VITALS
HEART RATE: 78 BPM | DIASTOLIC BLOOD PRESSURE: 78 MMHG | HEIGHT: 65 IN | BODY MASS INDEX: 34.35 KG/M2 | WEIGHT: 206.2 LBS | OXYGEN SATURATION: 98 % | SYSTOLIC BLOOD PRESSURE: 126 MMHG

## 2025-01-27 DIAGNOSIS — R73.9 HIGH BLOOD SUGAR: ICD-10-CM

## 2025-01-27 DIAGNOSIS — E78.00 HIGH CHOLESTEROL: ICD-10-CM

## 2025-01-27 DIAGNOSIS — I10 PRIMARY HYPERTENSION: ICD-10-CM

## 2025-01-27 DIAGNOSIS — R73.9 HIGH BLOOD SUGAR: Primary | ICD-10-CM

## 2025-01-27 DIAGNOSIS — K76.0 STEATOSIS OF LIVER: ICD-10-CM

## 2025-01-27 LAB
ALBUMIN SERPL-MCNC: 3.5 G/DL (ref 3.5–5)
ALBUMIN/GLOB SERPL: 1.1 (ref 1–1.9)
ALP SERPL-CCNC: 113 U/L (ref 35–104)
ALT SERPL-CCNC: 12 U/L (ref 8–45)
ANION GAP SERPL CALC-SCNC: 10 MMOL/L (ref 7–16)
AST SERPL-CCNC: 15 U/L (ref 15–37)
BASOPHILS # BLD: 0.03 K/UL (ref 0–0.2)
BASOPHILS NFR BLD: 0.4 % (ref 0–2)
BILIRUB SERPL-MCNC: 0.4 MG/DL (ref 0–1.2)
BUN SERPL-MCNC: 18 MG/DL (ref 6–23)
CALCIUM SERPL-MCNC: 9.5 MG/DL (ref 8.8–10.2)
CHLORIDE SERPL-SCNC: 100 MMOL/L (ref 98–107)
CHOLEST SERPL-MCNC: 204 MG/DL (ref 0–200)
CO2 SERPL-SCNC: 30 MMOL/L (ref 20–29)
CREAT SERPL-MCNC: 1.15 MG/DL (ref 0.6–1.1)
DIFFERENTIAL METHOD BLD: NORMAL
EOSINOPHIL # BLD: 0.1 K/UL (ref 0–0.8)
EOSINOPHIL NFR BLD: 1.4 % (ref 0.5–7.8)
ERYTHROCYTE [DISTWIDTH] IN BLOOD BY AUTOMATED COUNT: 14 % (ref 11.9–14.6)
EST. AVERAGE GLUCOSE BLD GHB EST-MCNC: 116 MG/DL
GLOBULIN SER CALC-MCNC: 3.2 G/DL (ref 2.3–3.5)
GLUCOSE SERPL-MCNC: 115 MG/DL (ref 70–99)
HBA1C MFR BLD: 5.7 % (ref 0–5.6)
HCT VFR BLD AUTO: 42.1 % (ref 35.8–46.3)
HDLC SERPL-MCNC: 53 MG/DL (ref 40–60)
HDLC SERPL: 3.8 (ref 0–5)
HGB BLD-MCNC: 13.3 G/DL (ref 11.7–15.4)
IMM GRANULOCYTES # BLD AUTO: 0.03 K/UL (ref 0–0.5)
IMM GRANULOCYTES NFR BLD AUTO: 0.4 % (ref 0–5)
LDLC SERPL CALC-MCNC: 122 MG/DL (ref 0–100)
LYMPHOCYTES # BLD: 1.99 K/UL (ref 0.5–4.6)
LYMPHOCYTES NFR BLD: 27.7 % (ref 13–44)
MCH RBC QN AUTO: 29.1 PG (ref 26.1–32.9)
MCHC RBC AUTO-ENTMCNC: 31.6 G/DL (ref 31.4–35)
MCV RBC AUTO: 92.1 FL (ref 82–102)
MONOCYTES # BLD: 0.47 K/UL (ref 0.1–1.3)
MONOCYTES NFR BLD: 6.5 % (ref 4–12)
NEUTS SEG # BLD: 4.57 K/UL (ref 1.7–8.2)
NEUTS SEG NFR BLD: 63.6 % (ref 43–78)
NRBC # BLD: 0 K/UL (ref 0–0.2)
PLATELET # BLD AUTO: 291 K/UL (ref 150–450)
PMV BLD AUTO: 10 FL (ref 9.4–12.3)
POTASSIUM SERPL-SCNC: 4.1 MMOL/L (ref 3.5–5.1)
PROT SERPL-MCNC: 6.6 G/DL (ref 6.3–8.2)
RBC # BLD AUTO: 4.57 M/UL (ref 4.05–5.2)
SODIUM SERPL-SCNC: 140 MMOL/L (ref 136–145)
TRIGL SERPL-MCNC: 143 MG/DL (ref 0–150)
VLDLC SERPL CALC-MCNC: 29 MG/DL (ref 6–23)
WBC # BLD AUTO: 7.2 K/UL (ref 4.3–11.1)

## 2025-01-27 PROCEDURE — 99214 OFFICE O/P EST MOD 30 MIN: CPT | Performed by: INTERNAL MEDICINE

## 2025-01-27 PROCEDURE — 3078F DIAST BP <80 MM HG: CPT | Performed by: INTERNAL MEDICINE

## 2025-01-27 PROCEDURE — 3074F SYST BP LT 130 MM HG: CPT | Performed by: INTERNAL MEDICINE

## 2025-01-27 RX ORDER — METHOCARBAMOL 750 MG/1
750 TABLET, FILM COATED ORAL 3 TIMES DAILY PRN
COMMUNITY
Start: 2025-01-10

## 2025-01-27 RX ORDER — GUANFACINE 1 MG/1
1 TABLET ORAL NIGHTLY
COMMUNITY

## 2025-01-27 ASSESSMENT — ENCOUNTER SYMPTOMS: RESPIRATORY NEGATIVE: 1

## 2025-01-27 NOTE — PROGRESS NOTES
mouth at bedtime      buPROPion (WELLBUTRIN XL) 300 MG extended release tablet Take 1 tablet by mouth every morning      gabapentin (NEURONTIN) 100 MG capsule Take 1 capsule by mouth 3 times daily for 30 days. (Patient taking differently: Take 1 capsule by mouth nightly.) 90 capsule 0    Hyoscyamine Sulfate SL 0.125 MG SUBL Place 1 tablet under the tongue as needed      iron-vitamin C (VITRON-C)  MG TABS Take one daily (Patient not taking: Reported on 11/25/2024) 90 tablet 1     No current facility-administered medications for this visit.       Allergies as of 01/27/2025 - Fully Reviewed 01/27/2025   Allergen Reaction Noted    Amoxicillin Rash 12/01/2014    Sulfa antibiotics Nausea And Vomiting 03/27/2024       Review of Systems   Constitutional: Negative.    Respiratory: Negative.     Cardiovascular: Negative.        Objective:    Blood pressure 126/78, pulse 78, height 1.651 m (5' 5\"), weight 93.5 kg (206 lb 3.2 oz), SpO2 98%.    Physical Exam  Vitals and nursing note reviewed.   Constitutional:       Appearance: Normal appearance.   Cardiovascular:      Rate and Rhythm: Normal rate and regular rhythm.      Pulses: Normal pulses.      Heart sounds: Normal heart sounds.   Pulmonary:      Effort: Pulmonary effort is normal.      Breath sounds: Normal breath sounds.   Neurological:      Mental Status: She is alert.         No results found for this visit on 01/27/25.    Assessent & Plan    1. High blood sugar  -     Hemoglobin A1C; Future  2. High cholesterol  -     Lipid Panel; Future  -     Comprehensive Metabolic Panel; Future  3. Primary hypertension  -     CBC with Auto Differential; Future  4. Steatosis of liver     Assessment & Plan  1. Anger management issues.  She reports a resurgence of anger issues, likely exacerbated by current stressors, including financial and medical challenges. She has been prescribed guanfacine 1 mg at night by her psychiatrist. She is advised to continue with her current

## 2025-01-28 NOTE — PATIENT INSTRUCTIONS
We noticed on a recent visit that we missed an opportunity to add the additional resources you requested. We have attached them to the After Visit Summary for the date of service 1/27/2025 for your review.  Should you have any questions regarding the resources, please contact our office.    [facility]      [place] [employer]*  (Call [facility] if you need more resources.)    Meals on Wheels  They offer: Meal delivery for eligible individuals.  Contact: [phone]    [place] [employer]  They offer: Fresh food boxes. $5 for SNAP/EBT persons, $15 for all others.  Contact: [email or web address] (must preorder from site).   Helpful Info: Pickup every other Wednesday 11am-6pm at [address], [place] [address] [place] [address] Food Pantry  They offer: Groceries/food.  Contact: [phone]; [address] [place] [address]  Helpful Info: Open Thursday 8am-12pm.    [facility] Food Pant[facility]  They offer: Groceries/food.  Contact: [phone]; [address] [place] [address]  Helpful Info: Open 8am-5pm Monday-Thursdays, 8am-12pm Fridays.    [facility]  They offer: Groceries/food.  Contact: [phone]; [address] [place] [address]  Helpful Info: Must call for hours and availability.     [facility]   They offer: Clothes/ food.   Contact: [phone]; [address]  [place] [address]  Helpful Info: Food bags/ hygiene kits Each Wednesday 7am- 11pm.  Hot Meals every Saturday at 12 noon and every Sunday after [employer].  Hot Breakfast every Monday 7am- 8am.    [facility] Food Pant[facility]  They offer: Groceries/food.  Contact: [phone]  Helpful Info: Call for hours and availability.     [facility]  They offer: Emergency food.  Contact: [phone]; [address] [place] [address]  Helpful Info: Hours are Monday, Wednesday, and Friday 9am-1pm.     [employer]

## 2025-03-20 ENCOUNTER — TELEMEDICINE (OUTPATIENT)
Dept: PRIMARY CARE CLINIC | Facility: CLINIC | Age: 55
End: 2025-03-20

## 2025-03-20 ENCOUNTER — PATIENT MESSAGE (OUTPATIENT)
Dept: PRIMARY CARE CLINIC | Facility: CLINIC | Age: 55
End: 2025-03-20

## 2025-03-20 DIAGNOSIS — N39.0 URINARY TRACT INFECTION WITHOUT HEMATURIA, SITE UNSPECIFIED: Primary | ICD-10-CM

## 2025-03-20 DIAGNOSIS — N39.0 URINARY TRACT INFECTION WITHOUT HEMATURIA, SITE UNSPECIFIED: ICD-10-CM

## 2025-03-20 LAB
APPEARANCE UR: CLEAR
BACTERIA URNS QL MICRO: 0 /HPF
BILIRUB UR QL: NEGATIVE
CASTS URNS QL MICRO: 0 /LPF
COLOR UR: NORMAL
CRYSTALS URNS QL MICRO: NORMAL /LPF
EPI CELLS #/AREA URNS HPF: NORMAL /HPF
GLUCOSE UR STRIP.AUTO-MCNC: NEGATIVE MG/DL
HGB UR QL STRIP: NEGATIVE
KETONES UR QL STRIP.AUTO: NEGATIVE MG/DL
LEUKOCYTE ESTERASE UR QL STRIP.AUTO: NEGATIVE
MUCOUS THREADS URNS QL MICRO: 0 /LPF
NITRITE UR QL STRIP.AUTO: NEGATIVE
OTHER OBSERVATIONS: NORMAL
PH UR STRIP: 6 (ref 5–9)
PROT UR STRIP-MCNC: NEGATIVE MG/DL
RBC #/AREA URNS HPF: NORMAL /HPF
SP GR UR REFRACTOMETRY: 1.02 (ref 1–1.02)
URINE CULTURE IF INDICATED: NORMAL
UROBILINOGEN UR QL STRIP.AUTO: 0.2 EU/DL (ref 0.2–1)
WBC URNS QL MICRO: NORMAL /HPF

## 2025-03-20 PROCEDURE — 99214 OFFICE O/P EST MOD 30 MIN: CPT | Performed by: INTERNAL MEDICINE

## 2025-03-20 RX ORDER — NITROFURANTOIN 25; 75 MG/1; MG/1
100 CAPSULE ORAL 2 TIMES DAILY
Qty: 10 CAPSULE | Refills: 0 | Status: SHIPPED | OUTPATIENT
Start: 2025-03-20 | End: 2025-03-25

## 2025-03-20 NOTE — PROGRESS NOTES
FOLLOW UP VISIT    Subjective:    Sandra Lopez (: 1970) is a 54 y.o., female,   Chief Complaint   Patient presents with    Urinary Tract Infection       HPI:  HPI    History of Present Illness  The patient presents via virtual visit for evaluation of urinary symptoms.    She reports experiencing dysuria, characterized by a burning sensation during urination, and spasms localized to the right side of her waist. She is uncertain whether these spasms originate from her kidneys or bladder. The onset of these symptoms was approximately 3 days ago. She suspects a potential urinary tract infection and has expressed interest in undergoing a urinalysis to confirm this.    ALLERGIES  The patient is allergic to SULFA and AMOXICILLIN.     The following portions of the patient's history were reviewed and updated as appropriate:      Past Medical History:   Diagnosis Date    ADHD (attention deficit hyperactivity disorder) 24    Anemia 2024    Anxiety     Asthma     Chronic kidney disease 2024    Depression     Drug effect     Amoxicillin, Sulfa    GERD (gastroesophageal reflux disease)     Hypertension     Liver disease 2013    Fatty tissue    Menopausal symptoms     Migraine     Psychiatric disorder     drpression/anxiety    Trauma        Past Surgical History:   Procedure Laterality Date    CARDIAC PROCEDURE N/A 2024    Left heart cath / coronary angiography performed by Efrain Bryan MD at [facility] CARDIAC CATH LAB    COLONOSCOPY      HYSTERECTOMY (CERVIX STATUS UNKNOWN)      HYSTERECTOMY, VAGINAL      AUB caused by fibroids    UPPER GASTROINTESTINAL ENDOSCOPY         Family History   Problem Relation Age of Onset    High Cholesterol Father     Stroke Father     Arthritis Mother     Breast Cancer Mother     Cancer Mother     Osteoporosis Mother     Hypertension Mother     Cancer Brother         Non-hodgekins lymphoma, hodgekinns,    Depression Brother     Mental Illness

## 2025-03-22 LAB
BACTERIA SPEC CULT: NORMAL
SERVICE CMNT-IMP: NORMAL

## 2025-04-15 ENCOUNTER — PATIENT MESSAGE (OUTPATIENT)
Age: 55
End: 2025-04-15

## 2025-04-15 ENCOUNTER — PATIENT MESSAGE (OUTPATIENT)
Dept: PRIMARY CARE CLINIC | Facility: CLINIC | Age: 55
End: 2025-04-15

## 2025-04-15 DIAGNOSIS — D50.9 IRON DEFICIENCY ANEMIA, UNSPECIFIED IRON DEFICIENCY ANEMIA TYPE: ICD-10-CM

## 2025-04-15 RX ORDER — LISINOPRIL 10 MG/1
10 TABLET ORAL DAILY
Qty: 90 TABLET | Refills: 3 | Status: SHIPPED | OUTPATIENT
Start: 2025-04-15

## 2025-04-15 RX ORDER — FUROSEMIDE 20 MG/1
TABLET ORAL
Qty: 180 TABLET | Refills: 1 | Status: SHIPPED | OUTPATIENT
Start: 2025-04-15

## 2025-04-15 NOTE — TELEPHONE ENCOUNTER
Upcoming appt with  on 5/12/25.  Requested Prescriptions     Pending Prescriptions Disp Refills    lisinopril (PRINIVIL;ZESTRIL) 10 MG tablet 90 tablet 3     Sig: Take 1 tablet by mouth daily    furosemide (LASIX) 20 MG tablet 180 tablet 1     Sig: Take 1 tablet by mouth daily as needed for swelling

## 2025-04-16 RX ORDER — BACILLUS COAGULANS 1B CELL
CAPSULE ORAL
Qty: 90 TABLET | Refills: 1 | Status: SHIPPED | OUTPATIENT
Start: 2025-04-16

## 2025-05-12 ENCOUNTER — OFFICE VISIT (OUTPATIENT)
Age: 55
End: 2025-05-12

## 2025-05-12 VITALS
SYSTOLIC BLOOD PRESSURE: 112 MMHG | DIASTOLIC BLOOD PRESSURE: 64 MMHG | HEIGHT: 65 IN | HEART RATE: 50 BPM | BODY MASS INDEX: 35.82 KG/M2 | WEIGHT: 215 LBS

## 2025-05-12 DIAGNOSIS — R60.0 LEG EDEMA: ICD-10-CM

## 2025-05-12 DIAGNOSIS — R07.2 PRECORDIAL PAIN: ICD-10-CM

## 2025-05-12 DIAGNOSIS — I10 PRIMARY HYPERTENSION: Primary | ICD-10-CM

## 2025-05-12 PROCEDURE — 3078F DIAST BP <80 MM HG: CPT | Performed by: INTERNAL MEDICINE

## 2025-05-12 PROCEDURE — 3074F SYST BP LT 130 MM HG: CPT | Performed by: INTERNAL MEDICINE

## 2025-05-12 PROCEDURE — 99214 OFFICE O/P EST MOD 30 MIN: CPT | Performed by: INTERNAL MEDICINE

## 2025-05-12 RX ORDER — TORSEMIDE 20 MG/1
20 TABLET ORAL DAILY PRN
Qty: 90 TABLET | Refills: 3 | Status: SHIPPED | OUTPATIENT
Start: 2025-05-12

## 2025-05-12 NOTE — PROGRESS NOTES
[facility] CARDIOLOGY  [address] [place] [address], [address]  [place] [address]  PHONE: [phone]      25    NAME:  Sandra Lopez  : 1970  MRN: [id number]       SUBJECTIVE:   Sandra Lopez is a 55 y.o. female seen for a follow up visit regarding the following:     Chief Complaint   Patient presents with    Hypertension         HPI:    No cp or larson. No orthopnea or pnd. No palpitations or syncope.  Le edema intermittent.    Past Medical History, Past Surgical History, Family history, Social History, and Medications were all reviewed with the patient today and updated as necessary.     Current Outpatient Medications   Medication Sig Dispense Refill    torsemide (DEMADEX) 20 MG tablet Take 1 tablet by mouth daily as needed (edema) 90 tablet 3    iron-vitamin C (VITRON-C)  MG TABS Take one daily 90 tablet 1    lisinopril (PRINIVIL;ZESTRIL) 10 MG tablet Take 1 tablet by mouth daily 90 tablet 3    furosemide (LASIX) 20 MG tablet Take 1 tablet by mouth daily as needed for swelling 180 tablet 1    methocarbamol (ROBAXIN) 750 MG tablet Take 1 tablet by mouth 3 times daily as needed (back pain)      VORTIoxetine (TRINTELLIX) 10 MG TABS tablet Take 1 tablet by mouth daily      guanFACINE (TENEX) 1 MG tablet Take 1 tablet by mouth nightly      nystatin (MYCOSTATIN) 506006 UNIT/GM cream Apply topically 2 times daily. 30 g 1    NONFORMULARY 2 each daily [place] chewable with iron      aspirin 81 MG chewable tablet Take 1 tablet by mouth daily      Probiotic Product (PROBIOTIC DAILY PO) Take by mouth daily      vitamin D3 (CHOLECALCIFEROL) 125 MCG (5000 UT) TABS tablet Take 1 tablet by mouth daily 90 tablet 1    albuterol sulfate HFA (PROVENTIL;VENTOLIN;PROAIR) 108 (90 Base) MCG/ACT inhaler Inhale 2 puffs into the lungs every 6 hours as needed      ADDERALL, 10MG, 10 MG tablet Take 1 tablet by mouth 2 times daily.      cetirizine (ZYRTEC) 10 MG tablet       fluticasone (FLOVENT HFA) 220 MCG/ACT inhaler

## 2025-05-20 ENCOUNTER — PATIENT MESSAGE (OUTPATIENT)
Dept: PRIMARY CARE CLINIC | Facility: CLINIC | Age: 55
End: 2025-05-20

## 2025-05-21 ENCOUNTER — TELEMEDICINE (OUTPATIENT)
Dept: PRIMARY CARE CLINIC | Facility: CLINIC | Age: 55
End: 2025-05-21

## 2025-05-21 ENCOUNTER — PATIENT MESSAGE (OUTPATIENT)
Dept: PRIMARY CARE CLINIC | Facility: CLINIC | Age: 55
End: 2025-05-21

## 2025-05-21 DIAGNOSIS — R10.84 GENERALIZED ABDOMINAL PAIN: Primary | ICD-10-CM

## 2025-05-21 DIAGNOSIS — R10.84 GENERALIZED ABDOMINAL PAIN: ICD-10-CM

## 2025-05-21 DIAGNOSIS — N39.0 URINARY TRACT INFECTION WITHOUT HEMATURIA, SITE UNSPECIFIED: ICD-10-CM

## 2025-05-21 DIAGNOSIS — N18.9 CHRONIC KIDNEY DISEASE, UNSPECIFIED CKD STAGE: ICD-10-CM

## 2025-05-21 LAB
APPEARANCE UR: CLEAR
BACTERIA URNS QL MICRO: ABNORMAL /HPF
BASOPHILS # BLD: 0.04 K/UL (ref 0–0.2)
BASOPHILS NFR BLD: 0.6 % (ref 0–2)
BILIRUB UR QL: NEGATIVE
CASTS URNS QL MICRO: 0 /LPF
COLOR UR: ABNORMAL
CRYSTALS URNS QL MICRO: 0 /LPF
DIFFERENTIAL METHOD BLD: NORMAL
EOSINOPHIL # BLD: 0.09 K/UL (ref 0–0.8)
EOSINOPHIL NFR BLD: 1.3 % (ref 0.5–7.8)
EPI CELLS #/AREA URNS HPF: ABNORMAL /HPF (ref 0–5)
ERYTHROCYTE [DISTWIDTH] IN BLOOD BY AUTOMATED COUNT: 13.9 % (ref 11.9–14.6)
GLUCOSE UR STRIP.AUTO-MCNC: NEGATIVE MG/DL
HCT VFR BLD AUTO: 38.1 % (ref 35.8–46.3)
HGB BLD-MCNC: 12 G/DL (ref 11.7–15.4)
HGB UR QL STRIP: NEGATIVE
HYALINE CASTS URNS QL MICRO: ABNORMAL /LPF
IMM GRANULOCYTES # BLD AUTO: 0.01 K/UL (ref 0–0.5)
IMM GRANULOCYTES NFR BLD AUTO: 0.1 % (ref 0–5)
KETONES UR QL STRIP.AUTO: NEGATIVE MG/DL
LEUKOCYTE ESTERASE UR QL STRIP.AUTO: NEGATIVE
LYMPHOCYTES # BLD: 2.27 K/UL (ref 0.5–4.6)
LYMPHOCYTES NFR BLD: 31.7 % (ref 13–44)
MCH RBC QN AUTO: 29.6 PG (ref 26.1–32.9)
MCHC RBC AUTO-ENTMCNC: 31.5 G/DL (ref 31.4–35)
MCV RBC AUTO: 93.8 FL (ref 82–102)
MONOCYTES # BLD: 0.48 K/UL (ref 0.1–1.3)
MONOCYTES NFR BLD: 6.7 % (ref 4–12)
MUCOUS THREADS URNS QL MICRO: 0 /LPF
NEUTS SEG # BLD: 4.27 K/UL (ref 1.7–8.2)
NEUTS SEG NFR BLD: 59.6 % (ref 43–78)
NITRITE UR QL STRIP.AUTO: NEGATIVE
NRBC # BLD: 0 K/UL (ref 0–0.2)
PH UR STRIP: 5.5 (ref 5–9)
PLATELET # BLD AUTO: 315 K/UL (ref 150–450)
PMV BLD AUTO: 9.7 FL (ref 9.4–12.3)
PROT UR STRIP-MCNC: NEGATIVE MG/DL
RBC # BLD AUTO: 4.06 M/UL (ref 4.05–5.2)
RBC #/AREA URNS HPF: ABNORMAL /HPF (ref 0–5)
SP GR UR REFRACTOMETRY: 1.01 (ref 1–1.02)
URINE CULTURE IF INDICATED: ABNORMAL
UROBILINOGEN UR QL STRIP.AUTO: 0.2 EU/DL (ref 0.2–1)
WBC # BLD AUTO: 7.2 K/UL (ref 4.3–11.1)
WBC URNS QL MICRO: ABNORMAL /HPF (ref 0–4)

## 2025-05-21 PROCEDURE — 99214 OFFICE O/P EST MOD 30 MIN: CPT | Performed by: INTERNAL MEDICINE

## 2025-05-21 NOTE — PROGRESS NOTES
[place] [address]  Confirm you are appropriately licensed, registered, or certified to deliver care in the state where the patient is located as indicated above. If you are not or unsure, please re-schedule the visit: Yes, I confirm.         An electronic signature was used to authenticate this note.          The patient and/or patient representative voiced understanding and agreement with the current diagnoses, recommendations, and possible side effects.    No follow-up provider specified.      Candis Burns MD

## 2025-05-22 ENCOUNTER — RESULTS FOLLOW-UP (OUTPATIENT)
Dept: PRIMARY CARE CLINIC | Facility: CLINIC | Age: 55
End: 2025-05-22

## 2025-05-22 LAB
ANION GAP SERPL CALC-SCNC: 14 MMOL/L (ref 7–16)
BUN SERPL-MCNC: 19 MG/DL (ref 6–23)
CALCIUM SERPL-MCNC: 9.6 MG/DL (ref 8.8–10.2)
CHLORIDE SERPL-SCNC: 100 MMOL/L (ref 98–107)
CO2 SERPL-SCNC: 26 MMOL/L (ref 20–29)
CREAT SERPL-MCNC: 1.35 MG/DL (ref 0.6–1.1)
GLUCOSE SERPL-MCNC: 75 MG/DL (ref 70–99)
POTASSIUM SERPL-SCNC: 4.2 MMOL/L (ref 3.5–5.1)
SODIUM SERPL-SCNC: 140 MMOL/L (ref 136–145)

## 2025-05-22 RX ORDER — NITROFURANTOIN 25; 75 MG/1; MG/1
100 CAPSULE ORAL 2 TIMES DAILY
Qty: 10 CAPSULE | Refills: 0 | Status: SHIPPED | OUTPATIENT
Start: 2025-05-22 | End: 2025-05-27

## 2025-05-23 LAB
BACTERIA SPEC CULT: NORMAL
SERVICE CMNT-IMP: NORMAL

## 2025-05-27 ENCOUNTER — OFFICE VISIT (OUTPATIENT)
Dept: PRIMARY CARE CLINIC | Facility: CLINIC | Age: 55
End: 2025-05-27

## 2025-05-27 VITALS
HEART RATE: 57 BPM | OXYGEN SATURATION: 97 % | SYSTOLIC BLOOD PRESSURE: 110 MMHG | WEIGHT: 213.4 LBS | HEIGHT: 65 IN | BODY MASS INDEX: 35.56 KG/M2 | DIASTOLIC BLOOD PRESSURE: 78 MMHG | TEMPERATURE: 97.6 F

## 2025-05-27 DIAGNOSIS — R73.9 HIGH BLOOD SUGAR: ICD-10-CM

## 2025-05-27 DIAGNOSIS — K76.0 STEATOSIS OF LIVER: ICD-10-CM

## 2025-05-27 DIAGNOSIS — R10.84 GENERALIZED ABDOMINAL PAIN: ICD-10-CM

## 2025-05-27 DIAGNOSIS — I10 PRIMARY HYPERTENSION: ICD-10-CM

## 2025-05-27 DIAGNOSIS — N18.9 CHRONIC KIDNEY DISEASE, UNSPECIFIED CKD STAGE: Primary | ICD-10-CM

## 2025-05-27 DIAGNOSIS — N18.9 CHRONIC KIDNEY DISEASE, UNSPECIFIED CKD STAGE: ICD-10-CM

## 2025-05-27 DIAGNOSIS — E78.00 HIGH CHOLESTEROL: ICD-10-CM

## 2025-05-27 LAB
ANION GAP SERPL CALC-SCNC: 12 MMOL/L (ref 7–16)
BASOPHILS # BLD: 0.04 K/UL (ref 0–0.2)
BASOPHILS NFR BLD: 0.6 % (ref 0–2)
BUN SERPL-MCNC: 20 MG/DL (ref 6–23)
CALCIUM SERPL-MCNC: 9.1 MG/DL (ref 8.8–10.2)
CHLORIDE SERPL-SCNC: 104 MMOL/L (ref 98–107)
CHOLEST SERPL-MCNC: 171 MG/DL (ref 0–200)
CO2 SERPL-SCNC: 26 MMOL/L (ref 20–29)
CREAT SERPL-MCNC: 1.17 MG/DL (ref 0.6–1.1)
DIFFERENTIAL METHOD BLD: ABNORMAL
EOSINOPHIL # BLD: 0.14 K/UL (ref 0–0.8)
EOSINOPHIL NFR BLD: 2.1 % (ref 0.5–7.8)
ERYTHROCYTE [DISTWIDTH] IN BLOOD BY AUTOMATED COUNT: 14.1 % (ref 11.9–14.6)
EST. AVERAGE GLUCOSE BLD GHB EST-MCNC: 115 MG/DL
GLUCOSE SERPL-MCNC: 97 MG/DL (ref 70–99)
HBA1C MFR BLD: 5.6 % (ref 0–5.6)
HCT VFR BLD AUTO: 37.4 % (ref 35.8–46.3)
HDLC SERPL-MCNC: 55 MG/DL (ref 40–60)
HDLC SERPL: 3.1 (ref 0–5)
HGB BLD-MCNC: 11.9 G/DL (ref 11.7–15.4)
IMM GRANULOCYTES # BLD AUTO: 0.02 K/UL (ref 0–0.5)
IMM GRANULOCYTES NFR BLD AUTO: 0.3 % (ref 0–5)
LDLC SERPL CALC-MCNC: 96 MG/DL (ref 0–100)
LYMPHOCYTES # BLD: 2.03 K/UL (ref 0.5–4.6)
LYMPHOCYTES NFR BLD: 30.3 % (ref 13–44)
MCH RBC QN AUTO: 30.4 PG (ref 26.1–32.9)
MCHC RBC AUTO-ENTMCNC: 31.8 G/DL (ref 31.4–35)
MCV RBC AUTO: 95.7 FL (ref 82–102)
MONOCYTES # BLD: 0.53 K/UL (ref 0.1–1.3)
MONOCYTES NFR BLD: 7.9 % (ref 4–12)
NEUTS SEG # BLD: 3.94 K/UL (ref 1.7–8.2)
NEUTS SEG NFR BLD: 58.8 % (ref 43–78)
NRBC # BLD: 0 K/UL (ref 0–0.2)
PLATELET # BLD AUTO: 298 K/UL (ref 150–450)
PMV BLD AUTO: 9.8 FL (ref 9.4–12.3)
POTASSIUM SERPL-SCNC: 4.3 MMOL/L (ref 3.5–5.1)
RBC # BLD AUTO: 3.91 M/UL (ref 4.05–5.2)
SODIUM SERPL-SCNC: 142 MMOL/L (ref 136–145)
TRIGL SERPL-MCNC: 101 MG/DL (ref 0–150)
VLDLC SERPL CALC-MCNC: 20 MG/DL (ref 6–23)
WBC # BLD AUTO: 6.7 K/UL (ref 4.3–11.1)

## 2025-05-27 PROCEDURE — 3078F DIAST BP <80 MM HG: CPT | Performed by: INTERNAL MEDICINE

## 2025-05-27 PROCEDURE — 99214 OFFICE O/P EST MOD 30 MIN: CPT | Performed by: INTERNAL MEDICINE

## 2025-05-27 PROCEDURE — 3074F SYST BP LT 130 MM HG: CPT | Performed by: INTERNAL MEDICINE

## 2025-05-27 ASSESSMENT — ENCOUNTER SYMPTOMS: RESPIRATORY NEGATIVE: 1

## 2025-05-27 NOTE — PROGRESS NOTES
Social Connections     Frequency of Communication with Friends and Family: Not asked     Frequency of Social Gatherings with Friends and Family: Not asked   Intimate Partner Violence: Unknown (7/5/2024)    Received from [employer], [employer]    Intimate Partner Violence     Fear of Current or Ex-Partner: Not asked     Emotionally Abused: Not asked     Physically Abused: Not asked     Sexually Abused: Not asked   Housing Stability: High Risk (1/24/2025)    Housing Stability Vital Sign     Unable to Pay for Housing in the Last Year: Yes     Number of Times Moved in the Last Year: 3     Homeless in the Last Year: No       Current Outpatient Medications   Medication Sig Dispense Refill    nitrofurantoin, macrocrystal-monohydrate, (MACROBID) 100 MG capsule Take 1 capsule by mouth 2 times daily for 5 days 10 capsule 0    torsemide (DEMADEX) 20 MG tablet Take 1 tablet by mouth daily as needed (edema) 90 tablet 3    iron-vitamin C (VITRON-C)  MG TABS Take one daily 90 tablet 1    lisinopril (PRINIVIL;ZESTRIL) 10 MG tablet Take 1 tablet by mouth daily 90 tablet 3    methocarbamol (ROBAXIN) 750 MG tablet Take 1 tablet by mouth 3 times daily as needed (back pain)      guanFACINE (TENEX) 1 MG tablet Take 1 tablet by mouth nightly      nystatin (MYCOSTATIN) 726894 UNIT/GM cream Apply topically 2 times daily. 30 g 1    NONFORMULARY 2 each daily [place] chewable with iron      aspirin 81 MG chewable tablet Take 1 tablet by mouth daily      Probiotic Product (PROBIOTIC DAILY PO) Take by mouth daily      albuterol sulfate HFA (PROVENTIL;VENTOLIN;PROAIR) 108 (90 Base) MCG/ACT inhaler Inhale 2 puffs into the lungs every 6 hours as needed      ADDERALL, 10MG, 10 MG tablet Take 1 tablet by mouth 2 times daily.      cetirizine (ZYRTEC) 10 MG tablet       fluticasone (FLOVENT HFA) 220 MCG/ACT inhaler Inhale 2 puffs into the lungs 2 times daily      nystatin (MYCOSTATIN) 270426 UNIT/ML suspension       pantoprazole

## 2025-06-09 ENCOUNTER — PATIENT MESSAGE (OUTPATIENT)
Dept: PRIMARY CARE CLINIC | Facility: CLINIC | Age: 55
End: 2025-06-09

## 2025-06-09 ENCOUNTER — TELEMEDICINE (OUTPATIENT)
Dept: PRIMARY CARE CLINIC | Facility: CLINIC | Age: 55
End: 2025-06-09

## 2025-06-09 DIAGNOSIS — K76.0 STEATOSIS OF LIVER: ICD-10-CM

## 2025-06-09 DIAGNOSIS — F41.9 ANXIETY: ICD-10-CM

## 2025-06-09 DIAGNOSIS — N18.9 CHRONIC KIDNEY DISEASE, UNSPECIFIED CKD STAGE: ICD-10-CM

## 2025-06-09 DIAGNOSIS — E78.00 HIGH CHOLESTEROL: ICD-10-CM

## 2025-06-09 DIAGNOSIS — R73.9 HIGH BLOOD SUGAR: ICD-10-CM

## 2025-06-09 DIAGNOSIS — I10 PRIMARY HYPERTENSION: Primary | ICD-10-CM

## 2025-06-09 PROCEDURE — 99214 OFFICE O/P EST MOD 30 MIN: CPT | Performed by: INTERNAL MEDICINE

## 2025-06-09 NOTE — PROGRESS NOTES
FOLLOW UP VISIT    Subjective:    Sandra Lopez (: 1970) is a 55 y.o., female,   Chief Complaint   Patient presents with    Follow-up       HPI:  HPI      History of Present Illness  The patient is in for a follow-up visit. They are allergic to amoxicillin and sulfa. They are on Adderall for ADHD, albuterol inhaler for asthma, Wellbutrin for anxiety and depression, Zyrtec, Flovent, guanfacine prescribed by their psychiatrist, Vitron-C, lisinopril, Protonix for reflux, and Trintellix. They went to the ER on 2025 for palpitations.    Palpitations  - Experienced palpitations on 2025, waking up in the morning with a sensation of their heart racing.  - No chest pain, shortness of breath, nausea, or vomiting.  - Physical exam was normal.  - EKG showed normal sinus rhythm.  - Troponin levels were normal.  - Magnesium levels were normal.  - White blood cell count, hemoglobin, and hematocrit were normal.  - EKG showed some nonspecific T waves but no ST MI cardiac enzymes were detectable.  - They were dehydrated and received fluids.    Shakiness and Abdominal Pain  - Report persistent shakiness following their recent emergency room visit due to palpitations and abdominal pain.  - Despite a referral to cardiology, they declined as they are already under the care of Dr. Bryan, a cardiologist.  - Describe their current state as overwhelming, attributing this to the extensive information provided by Dr. Burns, which they find difficult to process.    Panic Attack Symptoms  - Currently uninsured and have been informed that their symptoms may be indicative of a panic attack.  - Report a sensation of their heart pounding, accompanied by hand tremors and profuse sweating.    Supplemental information: The patient is allergic to amoxicillin and sulfa.       The following portions of the patient's history were reviewed and updated as appropriate:      Past Medical History:   Diagnosis Date    ADHD (attention

## 2025-06-10 ENCOUNTER — OFFICE VISIT (OUTPATIENT)
Age: 55
End: 2025-06-10

## 2025-06-10 VITALS
DIASTOLIC BLOOD PRESSURE: 70 MMHG | SYSTOLIC BLOOD PRESSURE: 102 MMHG | HEIGHT: 65 IN | WEIGHT: 212 LBS | BODY MASS INDEX: 35.32 KG/M2 | HEART RATE: 68 BPM

## 2025-06-10 DIAGNOSIS — I10 PRIMARY HYPERTENSION: Primary | ICD-10-CM

## 2025-06-10 DIAGNOSIS — R07.2 PRECORDIAL PAIN: ICD-10-CM

## 2025-06-10 DIAGNOSIS — R60.0 LEG EDEMA: ICD-10-CM

## 2025-06-10 PROCEDURE — 3074F SYST BP LT 130 MM HG: CPT | Performed by: INTERNAL MEDICINE

## 2025-06-10 PROCEDURE — 99214 OFFICE O/P EST MOD 30 MIN: CPT | Performed by: INTERNAL MEDICINE

## 2025-06-10 PROCEDURE — 3078F DIAST BP <80 MM HG: CPT | Performed by: INTERNAL MEDICINE

## 2025-06-10 NOTE — PROGRESS NOTES
monitor      3. Leg edema  Stable.Continue demadex prn     4. Palpitations  One episode - If recur place a monitor    Return in about 6 months (around 12/10/2025).         TRISH ROWAN MD  6/10/2025  9:01 AM

## 2025-06-18 ENCOUNTER — COMMUNITY OUTREACH (OUTPATIENT)
Dept: PRIMARY CARE CLINIC | Facility: CLINIC | Age: 55
End: 2025-06-18

## 2025-07-14 ASSESSMENT — PATIENT HEALTH QUESTIONNAIRE - PHQ9
4. FEELING TIRED OR HAVING LITTLE ENERGY: NEARLY EVERY DAY
SUM OF ALL RESPONSES TO PHQ QUESTIONS 1-9: 14
1. LITTLE INTEREST OR PLEASURE IN DOING THINGS: SEVERAL DAYS
8. MOVING OR SPEAKING SO SLOWLY THAT OTHER PEOPLE COULD HAVE NOTICED. OR THE OPPOSITE - BEING SO FIDGETY OR RESTLESS THAT YOU HAVE BEEN MOVING AROUND A LOT MORE THAN USUAL: NOT AT ALL
6. FEELING BAD ABOUT YOURSELF - OR THAT YOU ARE A FAILURE OR HAVE LET YOURSELF OR YOUR FAMILY DOWN: MORE THAN HALF THE DAYS
7. TROUBLE CONCENTRATING ON THINGS, SUCH AS READING THE NEWSPAPER OR WATCHING TELEVISION: NOT AT ALL
SUM OF ALL RESPONSES TO PHQ QUESTIONS 1-9: 14
SUM OF ALL RESPONSES TO PHQ QUESTIONS 1-9: 14
3. TROUBLE FALLING OR STAYING ASLEEP: NEARLY EVERY DAY
6. FEELING BAD ABOUT YOURSELF - OR THAT YOU ARE A FAILURE OR HAVE LET YOURSELF OR YOUR FAMILY DOWN: MORE THAN HALF THE DAYS
1. LITTLE INTEREST OR PLEASURE IN DOING THINGS: SEVERAL DAYS
7. TROUBLE CONCENTRATING ON THINGS, SUCH AS READING THE NEWSPAPER OR WATCHING TELEVISION: NOT AT ALL
10. IF YOU CHECKED OFF ANY PROBLEMS, HOW DIFFICULT HAVE THESE PROBLEMS MADE IT FOR YOU TO DO YOUR WORK, TAKE CARE OF THINGS AT HOME, OR GET ALONG WITH OTHER PEOPLE: SOMEWHAT DIFFICULT
10. IF YOU CHECKED OFF ANY PROBLEMS, HOW DIFFICULT HAVE THESE PROBLEMS MADE IT FOR YOU TO DO YOUR WORK, TAKE CARE OF THINGS AT HOME, OR GET ALONG WITH OTHER PEOPLE: SOMEWHAT DIFFICULT
5. POOR APPETITE OR OVEREATING: MORE THAN HALF THE DAYS
2. FEELING DOWN, DEPRESSED OR HOPELESS: NEARLY EVERY DAY
5. POOR APPETITE OR OVEREATING: MORE THAN HALF THE DAYS
9. THOUGHTS THAT YOU WOULD BE BETTER OFF DEAD, OR OF HURTING YOURSELF: NOT AT ALL
2. FEELING DOWN, DEPRESSED OR HOPELESS: NEARLY EVERY DAY
8. MOVING OR SPEAKING SO SLOWLY THAT OTHER PEOPLE COULD HAVE NOTICED. OR THE OPPOSITE, BEING SO FIGETY OR RESTLESS THAT YOU HAVE BEEN MOVING AROUND A LOT MORE THAN USUAL: NOT AT ALL
3. TROUBLE FALLING OR STAYING ASLEEP: NEARLY EVERY DAY
4. FEELING TIRED OR HAVING LITTLE ENERGY: NEARLY EVERY DAY
SUM OF ALL RESPONSES TO PHQ QUESTIONS 1-9: 14
SUM OF ALL RESPONSES TO PHQ QUESTIONS 1-9: 14
9. THOUGHTS THAT YOU WOULD BE BETTER OFF DEAD, OR OF HURTING YOURSELF: NOT AT ALL

## 2025-07-14 ASSESSMENT — ANXIETY QUESTIONNAIRES
1. FEELING NERVOUS, ANXIOUS, OR ON EDGE: NEARLY EVERY DAY
6. BECOMING EASILY ANNOYED OR IRRITABLE: SEVERAL DAYS
IF YOU CHECKED OFF ANY PROBLEMS ON THIS QUESTIONNAIRE, HOW DIFFICULT HAVE THESE PROBLEMS MADE IT FOR YOU TO DO YOUR WORK, TAKE CARE OF THINGS AT HOME, OR GET ALONG WITH OTHER PEOPLE: SOMEWHAT DIFFICULT
3. WORRYING TOO MUCH ABOUT DIFFERENT THINGS: NEARLY EVERY DAY
7. FEELING AFRAID AS IF SOMETHING AWFUL MIGHT HAPPEN: NEARLY EVERY DAY
3. WORRYING TOO MUCH ABOUT DIFFERENT THINGS: NEARLY EVERY DAY
4. TROUBLE RELAXING: NEARLY EVERY DAY
4. TROUBLE RELAXING: NEARLY EVERY DAY
6. BECOMING EASILY ANNOYED OR IRRITABLE: SEVERAL DAYS
2. NOT BEING ABLE TO STOP OR CONTROL WORRYING: NEARLY EVERY DAY
1. FEELING NERVOUS, ANXIOUS, OR ON EDGE: NEARLY EVERY DAY
2. NOT BEING ABLE TO STOP OR CONTROL WORRYING: NEARLY EVERY DAY
7. FEELING AFRAID AS IF SOMETHING AWFUL MIGHT HAPPEN: NEARLY EVERY DAY
IF YOU CHECKED OFF ANY PROBLEMS ON THIS QUESTIONNAIRE, HOW DIFFICULT HAVE THESE PROBLEMS MADE IT FOR YOU TO DO YOUR WORK, TAKE CARE OF THINGS AT HOME, OR GET ALONG WITH OTHER PEOPLE: SOMEWHAT DIFFICULT

## 2025-07-14 ASSESSMENT — LIFESTYLE VARIABLES
HISTORY_ALCOHOL_USE: NO
PAST THREE MONTHS WHAT IS THE LARGEST AMOUNT OF ALCOHOLIC DRINKS YOU HAVE CONSUMED IN ONE DAY: 0
ALCOHOL_DAYS_PER_WEEK: 0
HAVE YOU EVER RECEIVED ALCOHOL OR OTHER DRUG ABUSE TREATMENT: YES

## 2025-07-17 ENCOUNTER — OFFICE VISIT (OUTPATIENT)
Dept: BEHAVIORAL/MENTAL HEALTH CLINIC | Facility: CLINIC | Age: 55
End: 2025-07-17

## 2025-07-17 VITALS
HEART RATE: 71 BPM | HEIGHT: 65 IN | BODY MASS INDEX: 35.39 KG/M2 | WEIGHT: 212.4 LBS | OXYGEN SATURATION: 97 % | SYSTOLIC BLOOD PRESSURE: 100 MMHG | DIASTOLIC BLOOD PRESSURE: 62 MMHG

## 2025-07-17 DIAGNOSIS — F33.1 MAJOR DEPRESSIVE DISORDER, RECURRENT, MODERATE (HCC): Primary | ICD-10-CM

## 2025-07-17 DIAGNOSIS — G47.00 INSOMNIA, UNSPECIFIED TYPE: ICD-10-CM

## 2025-07-17 DIAGNOSIS — F41.9 ANXIETY DISORDER, UNSPECIFIED TYPE: ICD-10-CM

## 2025-07-17 DIAGNOSIS — F90.0 ADHD (ATTENTION DEFICIT HYPERACTIVITY DISORDER), INATTENTIVE TYPE: ICD-10-CM

## 2025-07-17 PROCEDURE — 90792 PSYCH DIAG EVAL W/MED SRVCS: CPT | Performed by: PSYCHIATRY & NEUROLOGY

## 2025-07-17 RX ORDER — QUETIAPINE FUMARATE 25 MG/1
25-50 TABLET, FILM COATED ORAL
Qty: 60 TABLET | Refills: 3 | Status: SHIPPED | OUTPATIENT
Start: 2025-07-17

## 2025-07-17 RX ORDER — BUPROPION HYDROCHLORIDE 150 MG/1
150 TABLET ORAL EVERY MORNING
Qty: 30 TABLET | Refills: 1 | Status: SHIPPED | OUTPATIENT
Start: 2025-07-17

## 2025-07-30 ENCOUNTER — PATIENT MESSAGE (OUTPATIENT)
Dept: BEHAVIORAL/MENTAL HEALTH CLINIC | Facility: CLINIC | Age: 55
End: 2025-07-30

## 2025-07-30 ENCOUNTER — TELEPHONE (OUTPATIENT)
Dept: BEHAVIORAL/MENTAL HEALTH CLINIC | Facility: CLINIC | Age: 55
End: 2025-07-30

## 2025-07-30 RX ORDER — ESCITALOPRAM OXALATE 10 MG/1
10 TABLET ORAL DAILY
Qty: 30 TABLET | Refills: 1 | Status: SHIPPED | OUTPATIENT
Start: 2025-07-30

## 2025-07-30 NOTE — TELEPHONE ENCOUNTER
Pt called said she tried taking Zoloft and Seroquel but both caused \"terrible headache and stomach ache\".   She took them separate and together, same problem.

## (undated) DEVICE — DEVICE COMPR LNG 27 CM VASC BND

## (undated) DEVICE — RADIFOCUS OPTITORQUE ANGIOGRAPHIC CATHETER: Brand: OPTITORQUE

## (undated) DEVICE — CATHETER 5FR CORDIS PIG 145DEG 110CM

## (undated) DEVICE — GUIDEWIRE 035IN 210CM PTFE COAT FIX COR J TIP 15MM FIRM BODY

## (undated) DEVICE — GLIDESHEATH SLENDER STAINLESS STEEL KIT: Brand: GLIDESHEATH SLENDER